# Patient Record
Sex: MALE | Race: WHITE | NOT HISPANIC OR LATINO | Employment: OTHER | URBAN - METROPOLITAN AREA
[De-identification: names, ages, dates, MRNs, and addresses within clinical notes are randomized per-mention and may not be internally consistent; named-entity substitution may affect disease eponyms.]

---

## 2023-03-07 ENCOUNTER — OFFICE VISIT (OUTPATIENT)
Dept: PRIMARY CARE | Facility: CLINIC | Age: 68
End: 2023-03-07
Payer: MEDICARE

## 2023-03-07 ENCOUNTER — APPOINTMENT (OUTPATIENT)
Dept: PRIMARY CARE | Facility: CLINIC | Age: 68
End: 2023-03-07
Payer: MEDICARE

## 2023-03-07 VITALS
OXYGEN SATURATION: 93 % | HEART RATE: 72 BPM | BODY MASS INDEX: 35.22 KG/M2 | DIASTOLIC BLOOD PRESSURE: 82 MMHG | HEIGHT: 70 IN | SYSTOLIC BLOOD PRESSURE: 149 MMHG | WEIGHT: 246 LBS | TEMPERATURE: 98.1 F

## 2023-03-07 DIAGNOSIS — C67.9 BLADDER CARCINOMA (MULTI): ICD-10-CM

## 2023-03-07 DIAGNOSIS — R60.0 LOCALIZED EDEMA: ICD-10-CM

## 2023-03-07 DIAGNOSIS — E78.2 MIXED HYPERLIPIDEMIA: ICD-10-CM

## 2023-03-07 DIAGNOSIS — I10 PRIMARY HYPERTENSION: ICD-10-CM

## 2023-03-07 DIAGNOSIS — J44.9 COPD, MILD (MULTI): Primary | ICD-10-CM

## 2023-03-07 DIAGNOSIS — R73.9 HYPERGLYCEMIA: ICD-10-CM

## 2023-03-07 PROBLEM — D49.4 BLADDER TUMOR: Status: ACTIVE | Noted: 2023-03-07

## 2023-03-07 PROBLEM — I51.7 CARDIOMEGALY: Status: ACTIVE | Noted: 2023-03-07

## 2023-03-07 PROBLEM — N40.0 BPH (BENIGN PROSTATIC HYPERPLASIA): Status: ACTIVE | Noted: 2023-03-07

## 2023-03-07 PROBLEM — I65.23 BILATERAL CAROTID ARTERY STENOSIS: Status: ACTIVE | Noted: 2023-03-07

## 2023-03-07 PROBLEM — I65.29 CAROTID STENOSIS: Status: ACTIVE | Noted: 2023-03-07

## 2023-03-07 PROBLEM — Z93.6 PRESENCE OF UROSTOMY (MULTI): Status: ACTIVE | Noted: 2023-03-07

## 2023-03-07 PROBLEM — K76.0 FATTY LIVER: Status: ACTIVE | Noted: 2023-03-07

## 2023-03-07 PROBLEM — I77.810 DILATED AORTIC ROOT (CMS-HCC): Status: ACTIVE | Noted: 2023-03-07

## 2023-03-07 PROBLEM — M19.90 UNSPECIFIED OSTEOARTHRITIS, UNSPECIFIED SITE: Status: ACTIVE | Noted: 2023-03-07

## 2023-03-07 PROBLEM — E78.5 HYPERLIPIDEMIA: Status: ACTIVE | Noted: 2023-03-07

## 2023-03-07 PROCEDURE — 1160F RVW MEDS BY RX/DR IN RCRD: CPT | Performed by: FAMILY MEDICINE

## 2023-03-07 PROCEDURE — 99214 OFFICE O/P EST MOD 30 MIN: CPT | Performed by: FAMILY MEDICINE

## 2023-03-07 PROCEDURE — 1036F TOBACCO NON-USER: CPT | Performed by: FAMILY MEDICINE

## 2023-03-07 PROCEDURE — 3077F SYST BP >= 140 MM HG: CPT | Performed by: FAMILY MEDICINE

## 2023-03-07 PROCEDURE — 1159F MED LIST DOCD IN RCRD: CPT | Performed by: FAMILY MEDICINE

## 2023-03-07 PROCEDURE — 3079F DIAST BP 80-89 MM HG: CPT | Performed by: FAMILY MEDICINE

## 2023-03-07 RX ORDER — CEPHALEXIN 250 MG/1
250 CAPSULE ORAL DAILY
COMMUNITY
End: 2023-07-26

## 2023-03-07 RX ORDER — LOSARTAN POTASSIUM 100 MG/1
100 TABLET ORAL DAILY
COMMUNITY
Start: 2023-01-30 | End: 2023-06-26

## 2023-03-07 RX ORDER — HYDROCHLOROTHIAZIDE 12.5 MG/1
12.5 TABLET ORAL DAILY
Qty: 30 TABLET | Refills: 5 | Status: SHIPPED | OUTPATIENT
Start: 2023-03-07 | End: 2023-06-26

## 2023-03-07 ASSESSMENT — ENCOUNTER SYMPTOMS
DIAPHORESIS: 0
CHILLS: 0
FATIGUE: 0
SHORTNESS OF BREATH: 1

## 2023-03-07 NOTE — PROGRESS NOTES
"Subjective   Patient ID: Madan Marquez is a 67 y.o. male who presents for High BP and lower leg swelling (Pt is here with his wife d/t elevated BP at Dr. Huntley's office today; 170/93. Pt was there for an Echo; has a follow up scheduled with Dr. Huntley next week. Leg swelling onset 3 days ago. Wife states pt finished abx two days ago; had two teeth extracted. ).    Hypertension :  up today   120-140 /  70-80     Leg edema   Left > right     Left knee pain ,  A little calf tenderness ,  itching   Pain with walking on it       A little SOB  over last few months     Some RUSSELL   Occ swollen gland in right neck  Nasal congestion   Forehead with abnl sensation     Dental problems bilaterally     On prophylactic abx for UTIS     No blood clots in past   No bladder cancer treatment     Routine echo for previous chemo t           Review of Systems   Constitutional:  Negative for chills, diaphoresis and fatigue.   Respiratory:  Positive for shortness of breath.    Cardiovascular:  Negative for chest pain.       Objective   /82   Pulse 72   Temp 36.7 °C (98.1 °F)   Ht 1.778 m (5' 10\")   Wt 112 kg (246 lb)   SpO2 93%   BMI 35.30 kg/m²     Physical Exam  Constitutional:       Appearance: Normal appearance. He is well-developed.   Cardiovascular:      Rate and Rhythm: Normal rate and regular rhythm.      Heart sounds: Normal heart sounds. No murmur heard.     Comments: Bilateral lower extremity 1-2+ to mid tibia, left slightly greater than right, no warmth, no calf tenderness, venous stasis changes  Pulmonary:      Effort: Pulmonary effort is normal.      Breath sounds: Normal breath sounds.   Abdominal:      General: Bowel sounds are normal.      Palpations: Abdomen is soft.   Neurological:      General: No focal deficit present.      Mental Status: He is alert.         Assessment/Plan   Problem List Items Addressed This Visit       COPD, mild (CMS/HCC) - Primary    Hyperglycemia    Hyperlipidemia    Hypertension "    Bladder carcinoma (CMS/HCC)     Other Visit Diagnoses       Localized edema        Relevant Medications    hydroCHLOROthiazide (HYDRODiuril) 12.5 mg tablet    Other Relevant Orders    Basic Metabolic Panel

## 2023-03-07 NOTE — PATIENT INSTRUCTIONS
Blood work as ordered    Elevate the legs, low-salt diet    Hypertension: Change in medications as directed  add on hydrochlorothiazide ,      Check BPS for the next 1-2 weeks       Bladder carcinoma, continue current management per oncology

## 2023-03-14 ENCOUNTER — TELEPHONE (OUTPATIENT)
Dept: PRIMARY CARE | Facility: CLINIC | Age: 68
End: 2023-03-14
Payer: MEDICARE

## 2023-03-14 NOTE — TELEPHONE ENCOUNTER
Pt's wife, Antaoly states they were to call in with blood pressures:       129/79      131/74     139/78     132/76    Anatoly stated pt blood pressures are much improved, and he will be doing his blood work on Thursday.

## 2023-03-20 ENCOUNTER — LAB (OUTPATIENT)
Dept: LAB | Facility: LAB | Age: 68
End: 2023-03-20
Payer: MEDICARE

## 2023-03-20 DIAGNOSIS — R60.0 LOCALIZED EDEMA: ICD-10-CM

## 2023-03-20 LAB
ANION GAP IN SER/PLAS: 15 MMOL/L (ref 10–20)
CALCIUM (MG/DL) IN SER/PLAS: 9.6 MG/DL (ref 8.6–10.3)
CARBON DIOXIDE, TOTAL (MMOL/L) IN SER/PLAS: 27 MMOL/L (ref 21–32)
CHLORIDE (MMOL/L) IN SER/PLAS: 103 MMOL/L (ref 98–107)
CREATININE (MG/DL) IN SER/PLAS: 1.3 MG/DL (ref 0.5–1.3)
GFR MALE: 59 ML/MIN/1.73M2
GLUCOSE (MG/DL) IN SER/PLAS: 95 MG/DL (ref 74–99)
POTASSIUM (MMOL/L) IN SER/PLAS: 4.6 MMOL/L (ref 3.5–5.3)
SODIUM (MMOL/L) IN SER/PLAS: 140 MMOL/L (ref 136–145)
UREA NITROGEN (MG/DL) IN SER/PLAS: 25 MG/DL (ref 6–23)

## 2023-03-20 PROCEDURE — 36415 COLL VENOUS BLD VENIPUNCTURE: CPT

## 2023-03-20 PROCEDURE — 80048 BASIC METABOLIC PNL TOTAL CA: CPT

## 2023-03-22 DIAGNOSIS — R60.0 LOCALIZED EDEMA: ICD-10-CM

## 2023-03-22 NOTE — TELEPHONE ENCOUNTER
Result Communication    Resulted Orders   Basic Metabolic Panel   Result Value Ref Range    Glucose 95 74 - 99 mg/dL    Sodium 140 136 - 145 mmol/L    Potassium 4.6 3.5 - 5.3 mmol/L    Chloride 103 98 - 107 mmol/L    Bicarbonate 27 21 - 32 mmol/L    Anion Gap 15 10 - 20 mmol/L    Urea Nitrogen 25 (H) 6 - 23 mg/dL    Creatinine 1.30 0.50 - 1.30 mg/dL    GFR MALE 59 (A) >90 mL/min/1.73m2      Comment:       CALCULATIONS OF ESTIMATED GFR ARE PERFORMED   USING THE 2021 CKD-EPI STUDY REFIT EQUATION   WITHOUT THE RACE VARIABLE FOR THE IDMS-TRACEABLE   CREATININE METHODS.    https://jasn.asnjournals.org/content/early/2021/09/22/ASN.9771369796    Calcium 9.6 8.6 - 10.3 mg/dL       8:28 AM      Results were successfully communicated with the patient and they acknowledged their understanding.

## 2023-03-22 NOTE — TELEPHONE ENCOUNTER
----- Message from Beatrice Francis MD sent at 3/21/2023  1:19 PM EDT -----  Lab results show electrolytes are okay, kidney function a little borderline but to be expected with the water pill continue the same for now

## 2023-04-19 DIAGNOSIS — I10 ESSENTIAL (PRIMARY) HYPERTENSION: ICD-10-CM

## 2023-04-19 PROBLEM — R31.9 HEMATURIA: Status: RESOLVED | Noted: 2023-04-19 | Resolved: 2023-04-19

## 2023-04-19 PROBLEM — D49.2 GENERALIZED SKIN TUMORS: Status: ACTIVE | Noted: 2023-04-19

## 2023-04-19 PROBLEM — R91.8 LUNG NODULES: Status: ACTIVE | Noted: 2023-04-19

## 2023-04-19 PROBLEM — J96.11 CHRONIC RESPIRATORY FAILURE WITH HYPOXIA (MULTI): Status: ACTIVE | Noted: 2023-04-19

## 2023-04-19 PROBLEM — A41.9 SEPSIS (MULTI): Status: ACTIVE | Noted: 2023-04-19

## 2023-04-19 PROBLEM — N28.1 RENAL CYSTS, ACQUIRED, BILATERAL: Status: ACTIVE | Noted: 2023-04-19

## 2023-04-19 PROBLEM — J01.00 ACUTE MAXILLARY SINUSITIS: Status: RESOLVED | Noted: 2023-04-19 | Resolved: 2023-04-19

## 2023-04-19 PROBLEM — N39.0 ACUTE LOWER UTI: Status: RESOLVED | Noted: 2023-04-19 | Resolved: 2023-04-19

## 2023-04-19 RX ORDER — LOSARTAN POTASSIUM 50 MG/1
50 TABLET ORAL DAILY
Qty: 90 TABLET | Refills: 1 | Status: SHIPPED | OUTPATIENT
Start: 2023-04-19 | End: 2023-05-16 | Stop reason: DRUGHIGH

## 2023-05-16 ENCOUNTER — OFFICE VISIT (OUTPATIENT)
Dept: PRIMARY CARE | Facility: CLINIC | Age: 68
End: 2023-05-16
Payer: MEDICARE

## 2023-05-16 VITALS
OXYGEN SATURATION: 93 % | DIASTOLIC BLOOD PRESSURE: 80 MMHG | HEIGHT: 70 IN | BODY MASS INDEX: 34.65 KG/M2 | WEIGHT: 242 LBS | SYSTOLIC BLOOD PRESSURE: 135 MMHG | HEART RATE: 71 BPM

## 2023-05-16 DIAGNOSIS — Z93.6 PRESENCE OF UROSTOMY (MULTI): ICD-10-CM

## 2023-05-16 DIAGNOSIS — I10 PRIMARY HYPERTENSION: ICD-10-CM

## 2023-05-16 DIAGNOSIS — E78.2 MIXED HYPERLIPIDEMIA: ICD-10-CM

## 2023-05-16 DIAGNOSIS — J44.9 COPD, MILD (MULTI): Primary | ICD-10-CM

## 2023-05-16 DIAGNOSIS — C67.9 BLADDER CARCINOMA (MULTI): ICD-10-CM

## 2023-05-16 DIAGNOSIS — J96.11 CHRONIC RESPIRATORY FAILURE WITH HYPOXIA (MULTI): ICD-10-CM

## 2023-05-16 DIAGNOSIS — I77.810 DILATED AORTIC ROOT (CMS-HCC): ICD-10-CM

## 2023-05-16 DIAGNOSIS — M65.351 TRIGGER LITTLE FINGER OF RIGHT HAND: ICD-10-CM

## 2023-05-16 PROCEDURE — 1036F TOBACCO NON-USER: CPT | Performed by: FAMILY MEDICINE

## 2023-05-16 PROCEDURE — 3075F SYST BP GE 130 - 139MM HG: CPT | Performed by: FAMILY MEDICINE

## 2023-05-16 PROCEDURE — 99214 OFFICE O/P EST MOD 30 MIN: CPT | Performed by: FAMILY MEDICINE

## 2023-05-16 PROCEDURE — 1159F MED LIST DOCD IN RCRD: CPT | Performed by: FAMILY MEDICINE

## 2023-05-16 PROCEDURE — 1160F RVW MEDS BY RX/DR IN RCRD: CPT | Performed by: FAMILY MEDICINE

## 2023-05-16 PROCEDURE — 3079F DIAST BP 80-89 MM HG: CPT | Performed by: FAMILY MEDICINE

## 2023-05-16 ASSESSMENT — ENCOUNTER SYMPTOMS: SHORTNESS OF BREATH: 0

## 2023-05-16 NOTE — PATIENT INSTRUCTIONS
Hypertension Plan:  You should check your blood pressures 2-3 times per month.  Your goal should be systolic (upper number ) < 140, and diastolic (bottom number) < 90.  Please periodically inform office of your BP numbers.  You should follow a low salt diet and exercise routinely.  It is important that you keep your weight under control.  With hypertension you should be seen in the office at least twice per year.     Bladder cancer/UTIs: Continue management per urology, hematology      Trigger finger if bothersome will refer to hand surgeon

## 2023-05-16 NOTE — PROGRESS NOTES
Subjective   Patient ID: Madan Marquez is a 67 y.o. male who presents for Medication review with his wife. No complaints; states his cancer markers are negative.     Hypertension  : Patient is taking blood pressure medications as directed.  Blood pressures have been averaging:  Pt denies Chest Pain or Shortness of Breath     COPD: Stable, seeing pulmonology  Not needing O2       Bladder resection status post bladder cancer, tumor markers negative, recent heme-onc visit 418, stable  Signatera test was normal     No recent infections     Right pinky finger getting stuck at times not to some  Right         Review of Systems   Respiratory:  Negative for shortness of breath.    Cardiovascular:  Negative for chest pain.       Objective   There were no vitals taken for this visit.    Physical Exam  Constitutional:       Appearance: Normal appearance. He is well-developed.   Cardiovascular:      Rate and Rhythm: Normal rate and regular rhythm.      Heart sounds: Normal heart sounds. No murmur heard.  Pulmonary:      Effort: Pulmonary effort is normal.      Breath sounds: Normal breath sounds.   Musculoskeletal:      Comments: Right pinky finger with trigger finger   Neurological:      General: No focal deficit present.      Mental Status: He is alert.         Assessment/Plan   Problem List Items Addressed This Visit       COPD, mild (CMS/HCC) - Primary    Dilated aortic root (CMS/HCC)    Hyperlipidemia    Hypertension    Presence of urostomy (CMS/HCC)    Bladder carcinoma (CMS/HCC)    Chronic respiratory failure with hypoxia (CMS/HCC)

## 2023-06-06 ENCOUNTER — APPOINTMENT (OUTPATIENT)
Dept: LAB | Facility: LAB | Age: 68
End: 2023-06-06
Payer: MEDICARE

## 2023-06-07 LAB
ANION GAP IN SER/PLAS: 13 MMOL/L (ref 10–20)
CALCIUM (MG/DL) IN SER/PLAS: 10.3 MG/DL (ref 8.6–10.6)
CARBON DIOXIDE, TOTAL (MMOL/L) IN SER/PLAS: 27 MMOL/L (ref 21–32)
CHLORIDE (MMOL/L) IN SER/PLAS: 102 MMOL/L (ref 98–107)
CREATININE (MG/DL) IN SER/PLAS: 1.12 MG/DL (ref 0.5–1.3)
GFR MALE: 71 ML/MIN/1.73M2
GLUCOSE (MG/DL) IN SER/PLAS: 93 MG/DL (ref 74–99)
POTASSIUM (MMOL/L) IN SER/PLAS: 4 MMOL/L (ref 3.5–5.3)
SODIUM (MMOL/L) IN SER/PLAS: 138 MMOL/L (ref 136–145)
UREA NITROGEN (MG/DL) IN SER/PLAS: 21 MG/DL (ref 6–23)

## 2023-06-09 ENCOUNTER — TELEPHONE (OUTPATIENT)
Dept: PRIMARY CARE | Facility: CLINIC | Age: 68
End: 2023-06-09
Payer: MEDICARE

## 2023-06-10 NOTE — TELEPHONE ENCOUNTER
PT was recently proscribed a new medication from his dentist and wants to be sure it is ok to take with his current regiment of medications. Please call.  Per [facility] the Amoxicillin is fine to take but soince he is already takin gthe Cephalexin he does not need the Amoxicillin. Pt's wife Anatoly informed. [place]

## 2023-06-25 DIAGNOSIS — R60.0 LOCALIZED EDEMA: ICD-10-CM

## 2023-06-25 DIAGNOSIS — I10 ESSENTIAL (PRIMARY) HYPERTENSION: ICD-10-CM

## 2023-06-26 PROBLEM — E87.5 HYPERKALEMIA: Status: ACTIVE | Noted: 2023-06-26

## 2023-06-26 RX ORDER — LOSARTAN POTASSIUM 100 MG/1
TABLET ORAL
Qty: 90 TABLET | Refills: 1 | Status: SHIPPED | OUTPATIENT
Start: 2023-06-26 | End: 2023-12-22

## 2023-06-26 RX ORDER — HYDROCHLOROTHIAZIDE 12.5 MG/1
TABLET ORAL
Qty: 90 TABLET | Refills: 1 | Status: SHIPPED | OUTPATIENT
Start: 2023-06-26 | End: 2023-12-22

## 2023-07-26 DIAGNOSIS — Z93.6 OTHER ARTIFICIAL OPENINGS OF URINARY TRACT STATUS (MULTI): ICD-10-CM

## 2023-07-26 RX ORDER — CEPHALEXIN 250 MG/1
250 CAPSULE ORAL DAILY
Qty: 90 CAPSULE | Refills: 1 | Status: SHIPPED | OUTPATIENT
Start: 2023-07-26 | End: 2024-01-17

## 2023-08-16 PROBLEM — Z87.891 EX-CIGARETTE SMOKER: Status: ACTIVE | Noted: 2023-08-16

## 2023-09-02 PROBLEM — N28.1 KIDNEY CYSTS: Status: ACTIVE | Noted: 2023-09-02

## 2023-09-13 LAB
ALANINE AMINOTRANSFERASE (SGPT) (U/L) IN SER/PLAS: 24 U/L (ref 10–52)
ALBUMIN (G/DL) IN SER/PLAS: 4.1 G/DL (ref 3.4–5)
ALKALINE PHOSPHATASE (U/L) IN SER/PLAS: 63 U/L (ref 33–136)
ANION GAP IN SER/PLAS: 14 MMOL/L (ref 10–20)
ASPARTATE AMINOTRANSFERASE (SGOT) (U/L) IN SER/PLAS: 16 U/L (ref 9–39)
BILIRUBIN TOTAL (MG/DL) IN SER/PLAS: 0.5 MG/DL (ref 0–1.2)
CALCIUM (MG/DL) IN SER/PLAS: 9.6 MG/DL (ref 8.6–10.3)
CARBON DIOXIDE, TOTAL (MMOL/L) IN SER/PLAS: 25 MMOL/L (ref 21–32)
CHLORIDE (MMOL/L) IN SER/PLAS: 103 MMOL/L (ref 98–107)
CREATININE (MG/DL) IN SER/PLAS: 1.16 MG/DL (ref 0.5–1.3)
ERYTHROCYTE DISTRIBUTION WIDTH (RATIO) BY AUTOMATED COUNT: 13.6 % (ref 11.5–14.5)
ERYTHROCYTE MEAN CORPUSCULAR HEMOGLOBIN CONCENTRATION (G/DL) BY AUTOMATED: 31.4 G/DL (ref 32–36)
ERYTHROCYTE MEAN CORPUSCULAR VOLUME (FL) BY AUTOMATED COUNT: 94 FL (ref 80–100)
ERYTHROCYTES (10*6/UL) IN BLOOD BY AUTOMATED COUNT: 5.04 X10E12/L (ref 4.5–5.9)
GFR MALE: 68 ML/MIN/1.73M2
GLUCOSE (MG/DL) IN SER/PLAS: 148 MG/DL (ref 74–99)
HEMATOCRIT (%) IN BLOOD BY AUTOMATED COUNT: 47.2 % (ref 41–52)
HEMOGLOBIN (G/DL) IN BLOOD: 14.8 G/DL (ref 13.5–17.5)
LEUKOCYTES (10*3/UL) IN BLOOD BY AUTOMATED COUNT: 9 X10E9/L (ref 4.4–11.3)
PLATELETS (10*3/UL) IN BLOOD AUTOMATED COUNT: 290 X10E9/L (ref 150–450)
POTASSIUM (MMOL/L) IN SER/PLAS: 4.6 MMOL/L (ref 3.5–5.3)
PROTEIN TOTAL: 7.4 G/DL (ref 6.4–8.2)
SODIUM (MMOL/L) IN SER/PLAS: 137 MMOL/L (ref 136–145)
UREA NITROGEN (MG/DL) IN SER/PLAS: 22 MG/DL (ref 6–23)

## 2023-10-17 ENCOUNTER — OFFICE VISIT (OUTPATIENT)
Dept: HEMATOLOGY/ONCOLOGY | Facility: CLINIC | Age: 68
End: 2023-10-17
Payer: MEDICARE

## 2023-10-17 VITALS
HEART RATE: 73 BPM | OXYGEN SATURATION: 94 % | TEMPERATURE: 98.1 F | HEIGHT: 69 IN | BODY MASS INDEX: 36.38 KG/M2 | DIASTOLIC BLOOD PRESSURE: 80 MMHG | WEIGHT: 245.59 LBS | SYSTOLIC BLOOD PRESSURE: 160 MMHG | RESPIRATION RATE: 16 BRPM

## 2023-10-17 DIAGNOSIS — C67.9 BLADDER CARCINOMA (MULTI): ICD-10-CM

## 2023-10-17 PROCEDURE — 1126F AMNT PAIN NOTED NONE PRSNT: CPT | Performed by: INTERNAL MEDICINE

## 2023-10-17 PROCEDURE — 3079F DIAST BP 80-89 MM HG: CPT | Performed by: INTERNAL MEDICINE

## 2023-10-17 PROCEDURE — 1160F RVW MEDS BY RX/DR IN RCRD: CPT | Performed by: INTERNAL MEDICINE

## 2023-10-17 PROCEDURE — 99214 OFFICE O/P EST MOD 30 MIN: CPT | Performed by: INTERNAL MEDICINE

## 2023-10-17 PROCEDURE — 99214 OFFICE O/P EST MOD 30 MIN: CPT | Mod: PO | Performed by: INTERNAL MEDICINE

## 2023-10-17 PROCEDURE — 1036F TOBACCO NON-USER: CPT | Performed by: INTERNAL MEDICINE

## 2023-10-17 PROCEDURE — 1159F MED LIST DOCD IN RCRD: CPT | Performed by: INTERNAL MEDICINE

## 2023-10-17 PROCEDURE — 3077F SYST BP >= 140 MM HG: CPT | Performed by: INTERNAL MEDICINE

## 2023-10-17 ASSESSMENT — COLUMBIA-SUICIDE SEVERITY RATING SCALE - C-SSRS
1. IN THE PAST MONTH, HAVE YOU WISHED YOU WERE DEAD OR WISHED YOU COULD GO TO SLEEP AND NOT WAKE UP?: NO
6. HAVE YOU EVER DONE ANYTHING, STARTED TO DO ANYTHING, OR PREPARED TO DO ANYTHING TO END YOUR LIFE?: NO
2. HAVE YOU ACTUALLY HAD ANY THOUGHTS OF KILLING YOURSELF?: NO

## 2023-10-17 ASSESSMENT — PATIENT HEALTH QUESTIONNAIRE - PHQ9
SUM OF ALL RESPONSES TO PHQ9 QUESTIONS 1 AND 2: 0
1. LITTLE INTEREST OR PLEASURE IN DOING THINGS: NOT AT ALL
2. FEELING DOWN, DEPRESSED OR HOPELESS: NOT AT ALL

## 2023-10-17 ASSESSMENT — PAIN SCALES - GENERAL: PAINLEVEL: 0-NO PAIN

## 2023-10-17 NOTE — PROGRESS NOTES
"Patient ID: Madan Marquez is a 68 y.o. male.  Referring Physician: No referring provider defined for this encounter.  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 History of BCG for  non muscle invasive bladder cancer.  CT in follow up showed only bladder wall thickening which is unchanged.  Signatera has been negative.  When last seen he was septic and sent to ER for urosepsis.  CT at that time 10/10/22 was negative for metastatic  disease.     Review of Systems   Constitutional: Negative.    HENT:  Negative.     Eyes: Negative.    Respiratory: Negative.     Cardiovascular: Negative.    Gastrointestinal: Negative.    Genitourinary: Negative.          Objective   BSA: 2.33 meters squared  /80 (BP Location: Left arm, Patient Position: Sitting, BP Cuff Size: Adult)   Pulse 73   Temp 36.7 °C (98.1 °F) (Temporal)   Resp 16   Ht (S) 1.755 m (5' 9.09\")   Wt 111 kg (245 lb 9.5 oz)   SpO2 94%   BMI 36.17 kg/m²     Family History   Problem Relation Name Age of Onset    GI problems Mother      Heart failure Mother      Heart disease Father      Heart attack Father       Oncology History    No history exists.       Madan Marquez  reports that he has quit smoking. His smoking use included cigarettes. He has never used smokeless tobacco.  He  reports that he does not currently use alcohol.  He  reports no history of drug use.    Physical Exam  Constitutional:       Appearance: Normal appearance.   HENT:      Head: Normocephalic and atraumatic.      Nose: Nose normal.   Eyes:      Extraocular Movements: Extraocular movements intact.      Pupils: Pupils are equal, round, and reactive to light.   Cardiovascular:      Rate and Rhythm: Normal rate and regular rhythm.   Musculoskeletal:      Cervical back: Neck supple.   Neurological:      " Mental Status: He is alert.         Performance Status:  Asymptomatic    Assessment/Plan    S/P cystoprostatectomy for a muscle invasive bladder cancer, January 2022. T3a N0  MRD testing negative : RTC 6 months : Continue Signatera.     Offered adjuvant Cisplatin based chemotherapy and /or 1 year of adjuvant Opdivo. He is reluctant due to side effects etc. Post op CT C/A/P and Signatera test negative.      REcommend repeat Signatera every 3 months and CT body every 6 months, if Signatera is negative.  If orders for CT upcoming are valid from other physicians will not order CT for 4/10/23.  Will reorder Signatera and labs prior to OV with Dr Valencia in 3 months.     04/18/2023: Patient seen today patient diagnosed with muscle invasive bladder cancer January 2022 treated with cystoprostatectomy.  Patient refused chemotherapy.  Patient has since been negative for evidence of disease recurrence as evidenced by CT scanning  and Signatera testing.  We will continue the same.  RTC 6 months.      Diagnoses and all orders for this visit:  Bladder carcinoma (CMS/HCC)  -     Clinic Appointment Request Follow up; Future             Donnie-Woody Valencia MD

## 2023-10-17 NOTE — PATIENT INSTRUCTIONS
Thank you for the visit today with Dr. Valencia and his wonderful Nurse Jolene.  It is a pleasure as always to discuss your NEGATIVE Signatera results.  We will continue this every 3 month testing for 1 more year.  Dr. Ward will order your Cts.   We will see you in 6 months.

## 2023-10-18 ASSESSMENT — ENCOUNTER SYMPTOMS
EYES NEGATIVE: 1
CARDIOVASCULAR NEGATIVE: 1
CONSTITUTIONAL NEGATIVE: 1
GASTROINTESTINAL NEGATIVE: 1
RESPIRATORY NEGATIVE: 1

## 2023-11-14 ENCOUNTER — CLINICAL SUPPORT (OUTPATIENT)
Dept: PRIMARY CARE | Facility: CLINIC | Age: 68
End: 2023-11-14
Payer: MEDICARE

## 2023-11-14 DIAGNOSIS — Z23 NEEDS FLU SHOT: ICD-10-CM

## 2023-11-14 PROCEDURE — G0008 ADMIN INFLUENZA VIRUS VAC: HCPCS | Performed by: FAMILY MEDICINE

## 2023-11-14 PROCEDURE — 90662 IIV NO PRSV INCREASED AG IM: CPT | Performed by: FAMILY MEDICINE

## 2023-12-13 ENCOUNTER — LAB (OUTPATIENT)
Dept: LAB | Facility: LAB | Age: 68
End: 2023-12-13
Payer: MEDICARE

## 2023-12-13 DIAGNOSIS — C67.9 MALIGNANT NEOPLASM OF BLADDER, UNSPECIFIED (MULTI): Primary | ICD-10-CM

## 2023-12-13 LAB
ALBUMIN SERPL BCP-MCNC: 4.2 G/DL (ref 3.4–5)
ALP SERPL-CCNC: 56 U/L (ref 33–136)
ALT SERPL W P-5'-P-CCNC: 37 U/L (ref 10–52)
ANION GAP SERPL CALC-SCNC: 15 MMOL/L (ref 10–20)
AST SERPL W P-5'-P-CCNC: 21 U/L (ref 9–39)
BILIRUB SERPL-MCNC: 0.5 MG/DL (ref 0–1.2)
BUN SERPL-MCNC: 24 MG/DL (ref 6–23)
CALCIUM SERPL-MCNC: 10 MG/DL (ref 8.6–10.3)
CHLORIDE SERPL-SCNC: 104 MMOL/L (ref 98–107)
CO2 SERPL-SCNC: 25 MMOL/L (ref 21–32)
CREAT SERPL-MCNC: 1.23 MG/DL (ref 0.5–1.3)
ERYTHROCYTE [DISTWIDTH] IN BLOOD BY AUTOMATED COUNT: 13.5 % (ref 11.5–14.5)
GFR SERPL CREATININE-BSD FRML MDRD: 63 ML/MIN/1.73M*2
GLUCOSE SERPL-MCNC: 101 MG/DL (ref 74–99)
HCT VFR BLD AUTO: 48.3 % (ref 41–52)
HGB BLD-MCNC: 15.5 G/DL (ref 13.5–17.5)
MCH RBC QN AUTO: 30.3 PG (ref 26–34)
MCHC RBC AUTO-ENTMCNC: 32.1 G/DL (ref 32–36)
MCV RBC AUTO: 95 FL (ref 80–100)
NRBC BLD-RTO: 0 /100 WBCS (ref 0–0)
PLATELET # BLD AUTO: 303 X10*3/UL (ref 150–450)
POTASSIUM SERPL-SCNC: 4.5 MMOL/L (ref 3.5–5.3)
PROT SERPL-MCNC: 7.3 G/DL (ref 6.4–8.2)
RBC # BLD AUTO: 5.11 X10*6/UL (ref 4.5–5.9)
SODIUM SERPL-SCNC: 139 MMOL/L (ref 136–145)
WBC # BLD AUTO: 9.5 X10*3/UL (ref 4.4–11.3)

## 2023-12-13 PROCEDURE — 80053 COMPREHEN METABOLIC PANEL: CPT

## 2023-12-13 PROCEDURE — 85027 COMPLETE CBC AUTOMATED: CPT

## 2023-12-13 PROCEDURE — 36415 COLL VENOUS BLD VENIPUNCTURE: CPT

## 2023-12-14 DIAGNOSIS — C67.9 BLADDER CARCINOMA (MULTI): ICD-10-CM

## 2023-12-20 ENCOUNTER — LAB (OUTPATIENT)
Dept: LAB | Facility: HOSPITAL | Age: 68
End: 2023-12-20
Payer: MEDICARE

## 2023-12-20 DIAGNOSIS — C67.9 BLADDER CARCINOMA (MULTI): ICD-10-CM

## 2023-12-20 PROCEDURE — 36416 COLLJ CAPILLARY BLOOD SPEC: CPT

## 2023-12-22 DIAGNOSIS — R60.0 LOCALIZED EDEMA: ICD-10-CM

## 2023-12-22 DIAGNOSIS — I10 ESSENTIAL (PRIMARY) HYPERTENSION: ICD-10-CM

## 2023-12-22 PROBLEM — K43.5 PARASTOMAL HERNIA OF ILEAL CONDUIT: Status: ACTIVE | Noted: 2023-12-22

## 2023-12-22 RX ORDER — LOSARTAN POTASSIUM 100 MG/1
TABLET ORAL
Qty: 90 TABLET | Refills: 0 | Status: SHIPPED | OUTPATIENT
Start: 2023-12-22 | End: 2024-03-25

## 2023-12-22 RX ORDER — HYDROCHLOROTHIAZIDE 12.5 MG/1
12.5 TABLET ORAL DAILY
Qty: 90 TABLET | Refills: 0 | Status: SHIPPED | OUTPATIENT
Start: 2023-12-22 | End: 2024-03-18

## 2023-12-28 ENCOUNTER — HOSPITAL ENCOUNTER (OUTPATIENT)
Dept: RADIOLOGY | Facility: HOSPITAL | Age: 68
Discharge: HOME | End: 2023-12-28
Payer: MEDICARE

## 2023-12-28 DIAGNOSIS — C67.9 MALIGNANT NEOPLASM OF BLADDER, UNSPECIFIED (MULTI): ICD-10-CM

## 2023-12-28 PROCEDURE — 2550000001 HC RX 255 CONTRASTS: Performed by: STUDENT IN AN ORGANIZED HEALTH CARE EDUCATION/TRAINING PROGRAM

## 2023-12-28 PROCEDURE — 74178 CT ABD&PLV WO CNTR FLWD CNTR: CPT

## 2023-12-28 PROCEDURE — 71250 CT THORAX DX C-: CPT | Performed by: RADIOLOGY

## 2023-12-28 PROCEDURE — 71250 CT THORAX DX C-: CPT

## 2023-12-28 RX ADMIN — IOHEXOL 90 ML: 350 INJECTION, SOLUTION INTRAVENOUS at 09:45

## 2024-01-15 ENCOUNTER — APPOINTMENT (OUTPATIENT)
Dept: UROLOGY | Facility: HOSPITAL | Age: 69
End: 2024-01-15
Payer: MEDICARE

## 2024-01-15 ENCOUNTER — OFFICE VISIT (OUTPATIENT)
Dept: UROLOGY | Facility: CLINIC | Age: 69
End: 2024-01-15
Payer: MEDICARE

## 2024-01-15 DIAGNOSIS — C67.9 BLADDER CARCINOMA (MULTI): ICD-10-CM

## 2024-01-15 DIAGNOSIS — K57.92 DIVERTICULITIS: ICD-10-CM

## 2024-01-15 PROCEDURE — 99214 OFFICE O/P EST MOD 30 MIN: CPT | Performed by: STUDENT IN AN ORGANIZED HEALTH CARE EDUCATION/TRAINING PROGRAM

## 2024-01-15 PROCEDURE — 1036F TOBACCO NON-USER: CPT | Performed by: STUDENT IN AN ORGANIZED HEALTH CARE EDUCATION/TRAINING PROGRAM

## 2024-01-15 PROCEDURE — 1126F AMNT PAIN NOTED NONE PRSNT: CPT | Performed by: STUDENT IN AN ORGANIZED HEALTH CARE EDUCATION/TRAINING PROGRAM

## 2024-01-15 NOTE — PROGRESS NOTES
Subjective   Patient ID: Madan Marquez is a 68 y.o. male.    HPI  67 yo male F/U s/p open radical cystoprostatectomy and ileal conduit diversion January 7, 2022.   No complaints generally, lost some weight intentionally. Overall doing great.    He reports occasional left sided abdominal pain. No constipation. Has not seen GI.     CT urography 12/28/23:   IMPRESSION:  No hydronephrosis. Stable renal cysts. Ileal conduit and cystectomy  changes. No findings of metastatic disease progression. Marked  hepatic steatosis.      Right lower quadrant parastomal hernia with some mild edema in the  hernia sac when compared to prior examination. Clinical correlation  advised to exclude any component of edema or incarceration. Small  umbilical hernia containing fat.      Diverticulosis with some fluid seen in the colon. Query any component  of colitis      CT chest 12/28/23:   IMPRESSION:  1.  Stable emphysema as well as pulmonary nodules. No growing  pulmonary nodules. Enlarged main pulmonary artery compatible  pulmonary arterial hypertension. Stable hilar and mediastinal  slightly prominent lymph nodes. No growing lymph nodes.    Review of Systems   All other systems reviewed and are negative.      Objective   Physical Exam  Vitals reviewed.     Abdomen: /right urostomy normal-looking, clear urine, umbilical hernia, tender, abdomen soft    Assessment/Plan   67 yo male with [id number] bladder cancer s/p open radical cystoprostatectomy and ileal conduit diversion in January 2022, doing great. No evidence of recurrence on imaging.     I personally reviewed the images and the reports  of the CT. This showed stable emphysema as well as pulmonary nodules. No growing pulmonary nodules. Enlarged main pulmonary artery compatible pulmonary arterial hypertension. Diverticulosis.     We discussed shifting to every 6-month imaging.     I recommend he see GI for evaluation of left sided abdominal pain and diverticulosis.  Referral  placed.     Plan:   CBC, CMP, CTU and CT chest June 2024.   Referral to GI for diverticulosis and abdominal pain.   FUV July 2024 after imaging.   Diagnoses and all orders for this visit:  Bladder carcinoma (CMS/HCC)  -     CT chest wo IV contrast; Future  -     CT urography w 3D volume rendered imaging; Future  -     CBC; Future  -     Comprehensive metabolic panel; Future  Diverticulitis  -     Referral to Gastroenterology; Future    Scribe Attestation  By signing my name below, IDanika Scribe attest that this documentation has been prepared under the direction and in the presence of Carlos Manuel Ward MD.

## 2024-01-17 DIAGNOSIS — Z93.6 OTHER ARTIFICIAL OPENINGS OF URINARY TRACT STATUS (MULTI): ICD-10-CM

## 2024-01-17 PROBLEM — R53.1 WEAKNESS: Status: ACTIVE | Noted: 2024-01-17

## 2024-01-17 PROBLEM — H69.90 DYSFUNCTION OF EUSTACHIAN TUBE: Status: RESOLVED | Noted: 2024-01-17 | Resolved: 2024-01-17

## 2024-01-17 PROBLEM — R78.81 BACTEREMIA: Status: RESOLVED | Noted: 2022-10-12 | Resolved: 2024-01-17

## 2024-01-17 PROBLEM — R06.09 DYSPNEA ON EXERTION: Status: ACTIVE | Noted: 2024-01-17

## 2024-01-17 PROBLEM — Z66 DO NOT RESUSCITATE: Status: ACTIVE | Noted: 2022-10-12

## 2024-01-17 PROBLEM — A41.9 SEPTIC SHOCK (MULTI): Status: RESOLVED | Noted: 2024-01-17 | Resolved: 2024-01-17

## 2024-01-17 PROBLEM — E11.9 TYPE 2 DIABETES MELLITUS (MULTI): Status: ACTIVE | Noted: 2024-01-17

## 2024-01-17 PROBLEM — S37.009A INJURY OF KIDNEY: Status: RESOLVED | Noted: 2022-10-27 | Resolved: 2024-01-17

## 2024-01-17 PROBLEM — J30.9 ALLERGIC RHINITIS: Status: ACTIVE | Noted: 2024-01-17

## 2024-01-17 PROBLEM — R65.21 SEPTIC SHOCK (MULTI): Status: RESOLVED | Noted: 2024-01-17 | Resolved: 2024-01-17

## 2024-01-17 PROBLEM — R09.02 HYPOXIA: Status: RESOLVED | Noted: 2024-01-17 | Resolved: 2024-01-17

## 2024-01-17 PROBLEM — Z20.822 CONTACT WITH AND (SUSPECTED) EXPOSURE TO COVID-19: Status: RESOLVED | Noted: 2022-10-27 | Resolved: 2024-01-17

## 2024-01-17 PROBLEM — D72.829 LEUKOCYTOSIS: Status: RESOLVED | Noted: 2023-04-04 | Resolved: 2024-01-17

## 2024-01-17 PROBLEM — G89.18 ACUTE POSTOPERATIVE PAIN: Status: RESOLVED | Noted: 2024-01-17 | Resolved: 2024-01-17

## 2024-01-17 PROBLEM — R63.5 WEIGHT GAIN: Status: ACTIVE | Noted: 2024-01-17

## 2024-01-17 PROBLEM — R59.9 ADENOPATHY: Status: ACTIVE | Noted: 2023-05-17

## 2024-01-17 LAB — SCAN RESULT: NORMAL

## 2024-01-17 RX ORDER — TIOTROPIUM BROMIDE 18 UG/1
CAPSULE ORAL; RESPIRATORY (INHALATION)
COMMUNITY
Start: 2020-02-07

## 2024-01-17 RX ORDER — PREDNISONE 20 MG/1
TABLET ORAL
COMMUNITY
Start: 2016-02-08 | End: 2024-04-25 | Stop reason: ALTCHOICE

## 2024-01-17 RX ORDER — CEPHALEXIN 250 MG/1
250 CAPSULE ORAL DAILY
Qty: 90 CAPSULE | Refills: 1 | Status: SHIPPED | OUTPATIENT
Start: 2024-01-17

## 2024-03-04 DIAGNOSIS — C67.9 BLADDER CARCINOMA (MULTI): ICD-10-CM

## 2024-03-13 ENCOUNTER — LAB (OUTPATIENT)
Dept: LAB | Facility: HOSPITAL | Age: 69
End: 2024-03-13
Payer: MEDICARE

## 2024-03-13 DIAGNOSIS — C67.9 BLADDER CARCINOMA (MULTI): ICD-10-CM

## 2024-03-13 PROCEDURE — 36415 COLL VENOUS BLD VENIPUNCTURE: CPT

## 2024-03-17 DIAGNOSIS — R60.0 LOCALIZED EDEMA: ICD-10-CM

## 2024-03-18 RX ORDER — HYDROCHLOROTHIAZIDE 12.5 MG/1
12.5 TABLET ORAL DAILY
Qty: 90 TABLET | Refills: 0 | Status: SHIPPED | OUTPATIENT
Start: 2024-03-18 | End: 2024-04-25 | Stop reason: SDUPTHER

## 2024-03-23 DIAGNOSIS — I10 ESSENTIAL (PRIMARY) HYPERTENSION: ICD-10-CM

## 2024-03-25 RX ORDER — LOSARTAN POTASSIUM 100 MG/1
TABLET ORAL
Qty: 90 TABLET | Refills: 0 | Status: SHIPPED | OUTPATIENT
Start: 2024-03-25 | End: 2024-04-25 | Stop reason: SDUPTHER

## 2024-03-26 LAB — SCAN RESULT: NORMAL

## 2024-04-18 ENCOUNTER — OFFICE VISIT (OUTPATIENT)
Dept: HEMATOLOGY/ONCOLOGY | Facility: CLINIC | Age: 69
End: 2024-04-18
Payer: MEDICARE

## 2024-04-18 VITALS
SYSTOLIC BLOOD PRESSURE: 123 MMHG | OXYGEN SATURATION: 96 % | TEMPERATURE: 97.5 F | WEIGHT: 246.25 LBS | HEART RATE: 67 BPM | BODY MASS INDEX: 36.27 KG/M2 | RESPIRATION RATE: 20 BRPM | DIASTOLIC BLOOD PRESSURE: 71 MMHG

## 2024-04-18 DIAGNOSIS — C67.9 BLADDER CARCINOMA (MULTI): ICD-10-CM

## 2024-04-18 PROCEDURE — 4010F ACE/ARB THERAPY RXD/TAKEN: CPT | Performed by: INTERNAL MEDICINE

## 2024-04-18 PROCEDURE — 99214 OFFICE O/P EST MOD 30 MIN: CPT | Performed by: INTERNAL MEDICINE

## 2024-04-18 PROCEDURE — 3078F DIAST BP <80 MM HG: CPT | Performed by: INTERNAL MEDICINE

## 2024-04-18 PROCEDURE — 1126F AMNT PAIN NOTED NONE PRSNT: CPT | Performed by: INTERNAL MEDICINE

## 2024-04-18 PROCEDURE — 3074F SYST BP LT 130 MM HG: CPT | Performed by: INTERNAL MEDICINE

## 2024-04-18 PROCEDURE — 1159F MED LIST DOCD IN RCRD: CPT | Performed by: INTERNAL MEDICINE

## 2024-04-18 ASSESSMENT — PAIN SCALES - GENERAL: PAINLEVEL: 0-NO PAIN

## 2024-04-18 NOTE — PATIENT INSTRUCTIONS
Today you met with Dr. Valencia and you will continue your Signatera every 3 months.  Continue your imaging with Dr. Ward.  Stay active and drink more fluids.  Think about metamucil.    Please call the office for any questions or concerns.  Review your schedule prior to leaving [facility].  We ask that you notify us 5-7 days before your medications need refilled.   [facility] is strongly encouraging all patients to access their MyChart for all results and to communicate with their provider for non-urgent messages.  If an urgent/same day/sick concern response is needed, please call the office at [phone]. Thank You!

## 2024-04-18 NOTE — PROGRESS NOTES
Patient ID: Madan Marquez is a 68 y.o. male.  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 History of BCG for  non muscle invasive bladder cancer.  CT in follow up showed only bladder wall thickening which is unchanged.  Signatera has been negative.  When last seen he was septic and sent to ER for urosepsis.  CT at that time 10/10/22 was negative for metastatic  disease.   Review of Systems   Constitutional: Negative.    HENT:  Negative.     Respiratory: Negative.     Cardiovascular: Negative.         Objective   BSA: 2.34 meters squared  /71 (BP Location: Left arm, Patient Position: Sitting, BP Cuff Size: Large adult)   Pulse 67   Temp 36.4 °C (97.5 °F) (Temporal)   Resp 20   Wt 112 kg (246 lb 4.1 oz)   SpO2 96%   BMI 36.27 kg/m²      has a past medical history of Abdominal distension (gaseous) (05/08/2019), Acute lower UTI (04/19/2023), Acute maxillary sinusitis (04/19/2023), Acute postoperative pain (01/17/2024), Bacteremia (10/12/2022), Body mass index (BMI) 35.0-35.9, adult (04/07/2021), Contact with and (suspected) exposure to covid-19 (10/27/2022), Dysfunction of eustachian tube (01/17/2024), Elevated blood-pressure reading, without diagnosis of hypertension (11/11/2016), Hematuria (04/19/2023), Hypoxia (01/17/2024), Ingrowing nail (08/27/2017), Injury of kidney (10/27/2022), Leukocytosis (04/04/2023), Localized edema (03/15/2019), Morbid (severe) obesity due to excess calories (Multi) (04/07/2021), Other chest pain (05/17/2019), Other transient cerebral ischemic attacks and related syndromes, Personal history of diseases of the blood and blood-forming organs and certain disorders  involving the immune mechanism (05/17/2019), Personal history of other diseases of the nervous system and sense organs (01/13/2020), Personal history of other diseases of the respiratory system (11/06/2017), Personal history of other diseases of the respiratory system (06/25/2019), Personal history of other diseases of the respiratory system (02/08/2016), Personal history of other endocrine, nutritional and metabolic disease (10/06/2021), Personal history of other specified conditions (01/22/2019), Personal history of other specified conditions (08/15/2018), Personal history of transient ischemic attack (TIA), and cerebral infarction without residual deficits (05/05/2017), Septic shock (Multi) (01/17/2024), and Unspecified symptoms and signs involving the genitourinary system (05/09/2019).   has a past surgical history that includes Other surgical history (11/06/2017); Tonsillectomy (11/06/2017); and Other surgical history (09/19/2022).  Family History   Problem Relation Name Age of Onset    GI problems Mother      Heart failure Mother      Heart disease Father      Heart attack Father       Oncology History    No history exists.       Madan Marquez  reports that he has quit smoking. His smoking use included cigarettes. He has never used smokeless tobacco.  He  reports that he does not currently use alcohol.  He  reports no history of drug use.    Physical Exam  Constitutional:       Appearance: Normal appearance.   HENT:      Head: Normocephalic and atraumatic.   Eyes:      Extraocular Movements: Extraocular movements intact.      Pupils: Pupils are equal, round, and reactive to light.   Neurological:      Mental Status: He is alert.         WBC   Date/Time Value Ref Range Status   12/13/2023 08:37 AM 9.5 4.4 - 11.3 x10*3/uL Final   09/13/2023 08:02 AM 9.0 4.4 - 11.3 x10E9/L Final   04/04/2023 08:44 AM 8.3 4.4 - 11.3 x10E9/L Final   12/20/2022 09:20 AM 9.7 4.4 - 11.3 x10E9/L Final     nRBC   Date Value Ref  "Range Status   12/13/2023 0.0 0.0 - 0.0 /100 WBCs Final   01/12/2022 0.0 0.0 - 0.0 /100 WBC Final   01/11/2022 0.0 0.0 - 0.0 /100 WBC Final   01/10/2022 0.0 0.0 - 0.0 /100 WBC Final     RBC   Date Value Ref Range Status   12/13/2023 5.11 4.50 - 5.90 x10*6/uL Final   09/13/2023 5.04 4.50 - 5.90 x10E12/L Final   04/04/2023 5.06 4.50 - 5.90 x10E12/L Final   12/20/2022 4.80 4.50 - 5.90 x10E12/L Final     Hemoglobin   Date Value Ref Range Status   12/13/2023 15.5 13.5 - 17.5 g/dL Final   09/13/2023 14.8 13.5 - 17.5 g/dL Final   04/04/2023 14.7 13.5 - 17.5 g/dL Final   12/20/2022 14.3 13.5 - 17.5 g/dL Final     Hematocrit   Date Value Ref Range Status   12/13/2023 48.3 41.0 - 52.0 % Final   09/13/2023 47.2 41.0 - 52.0 % Final   04/04/2023 46.6 41.0 - 52.0 % Final   12/20/2022 45.3 41.0 - 52.0 % Final     MCV   Date/Time Value Ref Range Status   12/13/2023 08:37 AM 95 80 - 100 fL Final   09/13/2023 08:02 AM 94 80 - 100 fL Final   04/04/2023 08:44 AM 92 80 - 100 fL Final   12/20/2022 09:20 AM 94 80 - 100 fL Final     MCH   Date/Time Value Ref Range Status   12/13/2023 08:37 AM 30.3 26.0 - 34.0 pg Final     MCHC   Date/Time Value Ref Range Status   12/13/2023 08:37 AM 32.1 32.0 - 36.0 g/dL Final   09/13/2023 08:02 AM 31.4 (L) 32.0 - 36.0 g/dL Final   04/04/2023 08:44 AM 31.5 (L) 32.0 - 36.0 g/dL Final   12/20/2022 09:20 AM 31.6 (L) 32.0 - 36.0 g/dL Final     RDW   Date/Time Value Ref Range Status   12/13/2023 08:37 AM 13.5 11.5 - 14.5 % Final   09/13/2023 08:02 AM 13.6 11.5 - 14.5 % Final   04/04/2023 08:44 AM 13.3 11.5 - 14.5 % Final   12/20/2022 09:20 AM 13.3 11.5 - 14.5 % Final     Platelets   Date/Time Value Ref Range Status   12/13/2023 08:37  150 - 450 x10*3/uL Final   09/13/2023 08:02  150 - 450 x10E9/L Final   04/04/2023 08:44  150 - 450 x10E9/L Final   12/20/2022 09:20  150 - 450 x10E9/L Final     No results found for: \"MPV\"  Neutrophils %   Date/Time Value Ref Range Status   04/04/2023 08:44 " AM 66.6 40.0 - 80.0 % Final   12/20/2022 09:20 AM 65.6 40.0 - 80.0 % Final   10/27/2022 05:13 PM 53.4 40.0 - 80.0 % Final     Immature Granulocytes %, Automated   Date/Time Value Ref Range Status   04/04/2023 08:44 AM 0.5 0.0 - 0.9 % Final     Comment:      Immature Granulocyte Count (IG) includes promyelocytes,    myelocytes and metamyelocytes but does not include bands.   Percent differential counts (%) should be interpreted in the   context of the absolute cell counts (cells/L).     12/20/2022 09:20 AM 1.3 (H) 0.0 - 0.9 % Final     Comment:      Immature Granulocyte Count (IG) includes promyelocytes,    myelocytes and metamyelocytes but does not include bands.   Percent differential counts (%) should be interpreted in the   context of the absolute cell counts (cells/L).     10/27/2022 05:13 PM 0.7 0.0 - 0.9 % Final     Comment:      Immature Granulocyte Count (IG) includes promyelocytes,    myelocytes and metamyelocytes but does not include bands.   Percent differential counts (%) should be interpreted in the   context of the absolute cell counts (cells/L).       Lymphocytes %   Date/Time Value Ref Range Status   04/04/2023 08:44 AM 20.6 13.0 - 44.0 % Final     Comment:      Percent differential counts (%) should be interpreted in the   context of the absolute cell counts (cells/L).     12/20/2022 09:20 AM 22.3 13.0 - 44.0 % Final   10/27/2022 05:13 PM 31.6 13.0 - 44.0 % Final     Monocytes %   Date/Time Value Ref Range Status   04/04/2023 08:44 AM 9.4 2.0 - 10.0 % Final   12/20/2022 09:20 AM 6.9 2.0 - 10.0 % Final   10/27/2022 05:13 PM 10.2 2.0 - 10.0 % Final     Eosinophils %   Date/Time Value Ref Range Status   04/04/2023 08:44 AM 2.2 0.0 - 6.0 % Final   12/20/2022 09:20 AM 2.8 0.0 - 6.0 % Final   10/27/2022 05:13 PM 2.5 0.0 - 6.0 % Final     Basophils %   Date/Time Value Ref Range Status   04/04/2023 08:44 AM 0.7 0.0 - 2.0 % Final   12/20/2022 09:20 AM 1.1 0.0 - 2.0 % Final   10/27/2022 05:13 PM 1.6 0.0 - 2.0  "% Final     Neutrophils Absolute   Date/Time Value Ref Range Status   04/04/2023 08:44 AM 5.53 1.20 - 7.70 x10E9/L Final   12/20/2022 09:20 AM 6.34 1.20 - 7.70 x10E9/L Final   10/27/2022 05:13 PM 4.40 1.20 - 7.70 x10E9/L Final     No results found for: \"IGABSOL\"  Lymphocytes Absolute   Date/Time Value Ref Range Status   04/04/2023 08:44 AM 1.71 1.20 - 4.80 x10E9/L Final   12/20/2022 09:20 AM 2.16 1.20 - 4.80 x10E9/L Final   10/27/2022 05:13 PM 2.61 1.20 - 4.80 x10E9/L Final     Monocytes Absolute   Date/Time Value Ref Range Status   04/04/2023 08:44 AM 0.78 0.10 - 1.00 x10E9/L Final   12/20/2022 09:20 AM 0.67 0.10 - 1.00 x10E9/L Final   10/27/2022 05:13 PM 0.84 0.10 - 1.00 x10E9/L Final     Eosinophils Absolute   Date/Time Value Ref Range Status   04/04/2023 08:44 AM 0.18 0.00 - 0.70 x10E9/L Final   12/20/2022 09:20 AM 0.27 0.00 - 0.70 x10E9/L Final   10/27/2022 05:13 PM 0.21 0.00 - 0.70 x10E9/L Final     Basophils Absolute   Date/Time Value Ref Range Status   04/04/2023 08:44 AM 0.06 0.00 - 0.10 x10E9/L Final   12/20/2022 09:20 AM 0.11 (H) 0.00 - 0.10 x10E9/L Final   10/27/2022 05:13 PM 0.13 (H) 0.00 - 0.10 x10E9/L Final       No components found for: \"PT\"  aPTT   Date/Time Value Ref Range Status   10/10/2022 11:30 AM 28 26 - 39 sec Final     Comment:       THE APTT IS NO LONGER USED FOR MONITORING     UNFRACTIONATED HEPARIN THERAPY.    FOR MONITORING HEPARIN THERAPY,     USE THE HEPARIN ASSAY.     S/P cystoprostatectomy for a muscle invasive bladder cancer, January 2022. T3a N0  MRD testing negative : RTC 6 months : Continue Signatera.     Offered adjuvant Cisplatin based chemotherapy and /or 1 year of adjuvant Opdivo. He is reluctant due to side effects etc. Post op CT C/A/P and Signatera test negative.      REcommend repeat Signatera every 3 months and CT body every 6 months, if Signatera is negative.  If orders for CT upcoming are valid from other physicians will not order CT for 4/10/23.  Will reorder " Signatera and labs prior to OV with Dr Saenz in 3 months.     04/18/2023: Patient seen today patient diagnosed with muscle invasive bladder cancer January 2022 treated with cystoprostatectomy.  Patient refused chemotherapy.  Patient has since been negative for evidence of disease recurrence as evidenced by CT scanning  and Signatera testing.      We will continue the same.  RTC 6 months.    Assessment/Plan      Patient seen today no complaints will continue minimal disease testing.  Return in 6 months.     Diagnoses and all orders for this visit:  Bladder carcinoma (Multi)  -     Clinic Appointment Request Follow up  -     Clinic Appointment Request Follow Up; CHELSEY SAENZ; Future           Chelsey Saenz MD

## 2024-04-22 ASSESSMENT — ENCOUNTER SYMPTOMS
CONSTITUTIONAL NEGATIVE: 1
RESPIRATORY NEGATIVE: 1
CARDIOVASCULAR NEGATIVE: 1

## 2024-04-23 PROBLEM — K57.92 DIVERTICULITIS: Status: RESOLVED | Noted: 2024-04-23 | Resolved: 2024-04-23

## 2024-04-23 PROBLEM — A41.9 SEPSIS (MULTI): Status: RESOLVED | Noted: 2023-04-19 | Resolved: 2024-04-23

## 2024-04-25 ENCOUNTER — TELEPHONE (OUTPATIENT)
Dept: PRIMARY CARE | Facility: CLINIC | Age: 69
End: 2024-04-25

## 2024-04-25 ENCOUNTER — OFFICE VISIT (OUTPATIENT)
Dept: PRIMARY CARE | Facility: CLINIC | Age: 69
End: 2024-04-25
Payer: MEDICARE

## 2024-04-25 VITALS
BODY MASS INDEX: 36.31 KG/M2 | HEART RATE: 78 BPM | OXYGEN SATURATION: 95 % | DIASTOLIC BLOOD PRESSURE: 72 MMHG | HEIGHT: 69 IN | WEIGHT: 245.13 LBS | SYSTOLIC BLOOD PRESSURE: 142 MMHG | TEMPERATURE: 98.4 F

## 2024-04-25 DIAGNOSIS — I77.810 DILATED AORTIC ROOT (CMS-HCC): ICD-10-CM

## 2024-04-25 DIAGNOSIS — Z00.00 ROUTINE GENERAL MEDICAL EXAMINATION AT A HEALTH CARE FACILITY: ICD-10-CM

## 2024-04-25 DIAGNOSIS — Z93.6 PRESENCE OF UROSTOMY (MULTI): ICD-10-CM

## 2024-04-25 DIAGNOSIS — C67.9: ICD-10-CM

## 2024-04-25 DIAGNOSIS — I10 PRIMARY HYPERTENSION: ICD-10-CM

## 2024-04-25 DIAGNOSIS — I10 ESSENTIAL (PRIMARY) HYPERTENSION: ICD-10-CM

## 2024-04-25 DIAGNOSIS — E11.65 TYPE 2 DIABETES MELLITUS WITH HYPERGLYCEMIA, WITHOUT LONG-TERM CURRENT USE OF INSULIN (MULTI): Primary | ICD-10-CM

## 2024-04-25 DIAGNOSIS — E11.65 TYPE 2 DIABETES MELLITUS WITH HYPERGLYCEMIA, WITHOUT LONG-TERM CURRENT USE OF INSULIN (MULTI): ICD-10-CM

## 2024-04-25 DIAGNOSIS — C67.9 BLADDER CARCINOMA (MULTI): ICD-10-CM

## 2024-04-25 DIAGNOSIS — R60.0 LOCALIZED EDEMA: ICD-10-CM

## 2024-04-25 DIAGNOSIS — J44.9 COPD, MILD (MULTI): ICD-10-CM

## 2024-04-25 DIAGNOSIS — Z00.00 ROUTINE GENERAL MEDICAL EXAMINATION AT HEALTH CARE FACILITY: Primary | ICD-10-CM

## 2024-04-25 DIAGNOSIS — E66.01 OBESITY, MORBID (MULTI): ICD-10-CM

## 2024-04-25 DIAGNOSIS — R73.9 HYPERGLYCEMIA: ICD-10-CM

## 2024-04-25 PROBLEM — D49.4 BLADDER TUMOR: Status: RESOLVED | Noted: 2023-03-07 | Resolved: 2024-04-25

## 2024-04-25 PROBLEM — J96.11 CHRONIC RESPIRATORY FAILURE WITH HYPOXIA (MULTI): Status: RESOLVED | Noted: 2023-04-19 | Resolved: 2024-04-25

## 2024-04-25 PROBLEM — R06.09 DYSPNEA ON EXERTION: Status: RESOLVED | Noted: 2024-01-17 | Resolved: 2024-04-25

## 2024-04-25 PROBLEM — R63.5 WEIGHT GAIN: Status: RESOLVED | Noted: 2024-01-17 | Resolved: 2024-04-25

## 2024-04-25 LAB — POC HEMOGLOBIN A1C: 7.5 % (ref 4.2–6.5)

## 2024-04-25 PROCEDURE — 1036F TOBACCO NON-USER: CPT | Performed by: FAMILY MEDICINE

## 2024-04-25 PROCEDURE — 99214 OFFICE O/P EST MOD 30 MIN: CPT | Performed by: FAMILY MEDICINE

## 2024-04-25 PROCEDURE — 3077F SYST BP >= 140 MM HG: CPT | Performed by: FAMILY MEDICINE

## 2024-04-25 PROCEDURE — 1170F FXNL STATUS ASSESSED: CPT | Performed by: FAMILY MEDICINE

## 2024-04-25 PROCEDURE — 3078F DIAST BP <80 MM HG: CPT | Performed by: FAMILY MEDICINE

## 2024-04-25 PROCEDURE — 1159F MED LIST DOCD IN RCRD: CPT | Performed by: FAMILY MEDICINE

## 2024-04-25 PROCEDURE — 1124F ACP DISCUSS-NO DSCNMKR DOCD: CPT | Performed by: FAMILY MEDICINE

## 2024-04-25 PROCEDURE — G0439 PPPS, SUBSEQ VISIT: HCPCS | Performed by: FAMILY MEDICINE

## 2024-04-25 PROCEDURE — 4010F ACE/ARB THERAPY RXD/TAKEN: CPT | Performed by: FAMILY MEDICINE

## 2024-04-25 PROCEDURE — 83036 HEMOGLOBIN GLYCOSYLATED A1C: CPT | Performed by: FAMILY MEDICINE

## 2024-04-25 PROCEDURE — 1160F RVW MEDS BY RX/DR IN RCRD: CPT | Performed by: FAMILY MEDICINE

## 2024-04-25 PROCEDURE — G0444 DEPRESSION SCREEN ANNUAL: HCPCS | Performed by: FAMILY MEDICINE

## 2024-04-25 RX ORDER — INSULIN PUMP SYRINGE, 3 ML
1 EACH MISCELLANEOUS ONCE
Qty: 1 EACH | Refills: 0 | Status: SHIPPED | OUTPATIENT
Start: 2024-04-25 | End: 2024-04-25

## 2024-04-25 RX ORDER — HYDROCHLOROTHIAZIDE 12.5 MG/1
12.5 TABLET ORAL DAILY
Qty: 90 TABLET | Refills: 1 | Status: SHIPPED | OUTPATIENT
Start: 2024-04-25

## 2024-04-25 RX ORDER — IBUPROFEN 200 MG
CAPSULE ORAL
Qty: 100 STRIP | Refills: 3 | Status: SHIPPED | OUTPATIENT
Start: 2024-04-25

## 2024-04-25 RX ORDER — LANCETS
EACH MISCELLANEOUS
Qty: 100 EACH | Refills: 3 | Status: SHIPPED | OUTPATIENT
Start: 2024-04-25

## 2024-04-25 RX ORDER — LOSARTAN POTASSIUM 100 MG/1
100 TABLET ORAL DAILY
Qty: 90 TABLET | Refills: 1 | Status: SHIPPED | OUTPATIENT
Start: 2024-04-25

## 2024-04-25 ASSESSMENT — ENCOUNTER SYMPTOMS: SHORTNESS OF BREATH: 0

## 2024-04-25 ASSESSMENT — ACTIVITIES OF DAILY LIVING (ADL)
BATHING: INDEPENDENT
MANAGING_FINANCES: INDEPENDENT
TAKING_MEDICATION: INDEPENDENT
GROCERY_SHOPPING: INDEPENDENT
DRESSING: INDEPENDENT
DOING_HOUSEWORK: INDEPENDENT

## 2024-04-25 ASSESSMENT — PATIENT HEALTH QUESTIONNAIRE - PHQ9
2. FEELING DOWN, DEPRESSED OR HOPELESS: NOT AT ALL
SUM OF ALL RESPONSES TO PHQ9 QUESTIONS 1 AND 2: 0
1. LITTLE INTEREST OR PLEASURE IN DOING THINGS: NOT AT ALL

## 2024-04-25 NOTE — TELEPHONE ENCOUNTER
Pt's wife, Anatoly, states they were to check with Medicare and get back to the office in regards to covered glucometers.  Medicare stated they will cover any glucometer as long as the pt is seen every 3 - 6 months for in office follow up.  There were no specific guidelines that were needed to follow. The pharmacy was confirmed as [facility].

## 2024-04-25 NOTE — PATIENT INSTRUCTIONS
Your goal should be to have fasting sugars levels : , 2 hrs post meal <170.  Please call with glucose levels over the next 2 to 3 weeks.      Suggest getting a machine   Call with brands that are covered       Aggressive diet and exercise and weight loss   Recheck in 3 months         Diabetes plan:   With diabetes it is recommended that you follow a low sugar low-carb  ADA diet.  Usually [id number] khalif per day as well as recommended.  Take your medications as directed.  If you have a glucometer and are on oral medications you should be checking your sugars several times per week.  If you are on insulin you should be taking your sugars several times per day.    Your goal should be to have fasting sugars levels : , 2 hrs post meal <170.  You need to see an eye doctor yearly to assess your retinas for diabetic changes yearly.  It is important to keep your weight under control and exercise regularly.  With diabetes you should be seen in the office every 3 months.      Hypertension Plan:  You should check your blood pressures 2-3 times per month.  Your goal should be systolic (upper number ) < 140, and diastolic (bottom number) < 90.  Please periodically inform office of your BP numbers.  You should follow a low salt diet and exercise routinely.  It is important that you keep your weight under control.  With hypertension you should be seen in the office at least twice per year.    Bladder cancer: Continue follow-up with urology and oncology    Advanced directives: I discussed with the patient  advanced care planning including explanation of discussions on advance directives for >15 min.  If patient does not have current up-to-date documents, examples and information provided on living will and power of .  Patient was encouraged to work on getting these documents completed.  [place] has simple advance directives and living will forms that can be used.  Also, you can get more involved forms done through  a  if you desire more detail on your living will plans or more involved plans for power of .

## 2024-04-25 NOTE — TELEPHONE ENCOUNTER
MD Myra Farr MA  Caller: Unspecified (Today,  1:02 PM)  Orders sent in          Previous Messages    Durable Medical Equipment  (Newest Message First)  View All Conversations on this Encounter  Beatrice Francis MD  You16 minutes ago (4:24 PM)       Orders sent in     You  Beatrice Francis MD3 hours ago (1:28 PM)       RX not set up; let me know what you would like to send in and I can set up. Thanks     Nan Ruggiero routed conversation to [facility] Clinical Support Staff3 hours ago (1:04 PM)     Nan Ruggiero3 hours ago (1:04 PM)     VG  Pt's wife, Anatoly, states they were to check with Medicare and get back to the office in regards to covered glucometers.  Medicare stated they will cover any glucometer as long as the pt is seen every 3 - 6 months for in office follow up.  There were no specific guidelines that were needed to follow. The pharmacy was confirmed as [facility].         Note        Anatoly Marquez [phone]  Nan Ruggiero3 hours ago (1:02 PM)   Spoke with pt's wife. [place]

## 2024-04-25 NOTE — PROGRESS NOTES
"Subjective   Reason for Visit: Madan Marquez is an 68 y.o. male here for a Medicare Wellness visit.     Past Medical, Surgical, and Family History reviewed and updated in chart.    Reviewed all medications by prescribing practitioner or clinical pharmacist (such as prescriptions, OTCs, herbal therapies and supplements) and documented in the medical record.        No diabetes meds in past   Not checking glucose     Bowels few times per day     Hypertension  : Patient is taking blood pressure medications as directed.  Blood pressures have been averaging:  Pt denies Chest Pain or Shortness of Breath      COPD: Stable, seeing pulmonology  Not needing O2         Bladder resection status post bladder cancer, tumor markers negative, recent heme-onc visit 4/18, stable  Signatera test was normal           Right pinky finger getting stuck at times not to some  Right          Patient Care Team:  Beatrice Francis MD as PCP - General (Family Medicine)  Beatrice Francis MD as PCP - [facility]CO Attributed Provider  Chelsey Valencia MD as Referring Physician (Hematology and Oncology)     Review of Systems   Respiratory:  Negative for shortness of breath.    Cardiovascular:  Negative for chest pain.       Objective   Vitals:  /72   Pulse 78   Temp 36.9 °C (98.4 °F)   Ht 1.753 m (5' 9\")   Wt 111 kg (245 lb 2 oz)   SpO2 95%   BMI 36.20 kg/m²       Physical Exam  Constitutional:       General: He is not in acute distress.     Appearance: Normal appearance.   HENT:      Head: Normocephalic and atraumatic.      Right Ear: Tympanic membrane, ear canal and external ear normal.      Left Ear: Tympanic membrane, ear canal and external ear normal.      Mouth/Throat:      Mouth: Mucous membranes are moist.   Eyes:      Extraocular Movements: Extraocular movements intact.      Conjunctiva/sclera: Conjunctivae normal.      Pupils: Pupils are equal, round, and reactive to light.   Cardiovascular:      Rate and Rhythm: Normal " rate and regular rhythm.      Heart sounds: No murmur heard.     Comments: Venous stasis changes  Trace ankle edema  Pulmonary:      Effort: Pulmonary effort is normal.      Breath sounds: Normal breath sounds.   Abdominal:      General: Bowel sounds are normal.      Palpations: Abdomen is soft.      Comments: Ostomy bag intact  Surrounding skin looks okay  Soft easily reducible umbilical hernia   Musculoskeletal:         General: Normal range of motion.      Cervical back: No rigidity.   Lymphadenopathy:      Cervical: No cervical adenopathy.   Skin:     General: Skin is warm and dry.      Findings: No rash.   Neurological:      General: No focal deficit present.      Mental Status: He is alert and oriented to person, place, and time.      Cranial Nerves: No cranial nerve deficit.      Gait: Gait normal.   Psychiatric:         Mood and Affect: Mood normal.         Behavior: Behavior normal.         Assessment/Plan   Problem List Items Addressed This Visit       COPD, mild (Multi)    Relevant Orders    Comprehensive Metabolic Panel    CBC    Lipid Panel    Dilated aortic root (CMS-HCC)    Relevant Orders    Comprehensive Metabolic Panel    CBC    Lipid Panel    Hyperglycemia    Relevant Orders    POCT glycosylated hemoglobin (Hb A1C) manually resulted (Completed)    Comprehensive Metabolic Panel    CBC    Lipid Panel    Hypertension    Relevant Orders    Comprehensive Metabolic Panel    CBC    Lipid Panel    Presence of urostomy (Multi)    Relevant Orders    Comprehensive Metabolic Panel    CBC    Lipid Panel    Bladder carcinoma (Multi)    Relevant Orders    Comprehensive Metabolic Panel    CBC    Lipid Panel    Recurrent bladder transitional cell carcinoma (Multi)    Relevant Orders    Comprehensive Metabolic Panel    CBC    Lipid Panel    Type 2 diabetes mellitus (Multi)    Relevant Orders    Comprehensive Metabolic Panel    CBC    Lipid Panel    Obesity, morbid (Multi)    Relevant Orders    Comprehensive  Metabolic Panel    CBC    Lipid Panel     Other Visit Diagnoses       Routine general medical examination at health care facility    -  Primary    Relevant Orders    Comprehensive Metabolic Panel    CBC    Lipid Panel    Localized edema        Relevant Medications    hydroCHLOROthiazide (Microzide) 12.5 mg tablet    Other Relevant Orders    Comprehensive Metabolic Panel    CBC    Lipid Panel    Essential (primary) hypertension        Relevant Medications    hydroCHLOROthiazide (Microzide) 12.5 mg tablet    losartan (Cozaar) 100 mg tablet    Other Relevant Orders    Comprehensive Metabolic Panel    CBC    Lipid Panel    Routine general medical examination at a health care facility        Relevant Orders    Comprehensive Metabolic Panel    CBC    Lipid Panel

## 2024-05-07 ENCOUNTER — LAB (OUTPATIENT)
Dept: LAB | Facility: LAB | Age: 69
End: 2024-05-07
Payer: MEDICARE

## 2024-05-07 DIAGNOSIS — I10 ESSENTIAL (PRIMARY) HYPERTENSION: ICD-10-CM

## 2024-05-07 DIAGNOSIS — Z00.00 ROUTINE GENERAL MEDICAL EXAMINATION AT HEALTH CARE FACILITY: ICD-10-CM

## 2024-05-07 DIAGNOSIS — R73.9 HYPERGLYCEMIA: ICD-10-CM

## 2024-05-07 DIAGNOSIS — Z93.6 PRESENCE OF UROSTOMY (MULTI): ICD-10-CM

## 2024-05-07 DIAGNOSIS — C67.9: ICD-10-CM

## 2024-05-07 DIAGNOSIS — R60.0 LOCALIZED EDEMA: ICD-10-CM

## 2024-05-07 DIAGNOSIS — I10 PRIMARY HYPERTENSION: ICD-10-CM

## 2024-05-07 DIAGNOSIS — Z00.00 ROUTINE GENERAL MEDICAL EXAMINATION AT A HEALTH CARE FACILITY: ICD-10-CM

## 2024-05-07 DIAGNOSIS — I77.810 DILATED AORTIC ROOT (CMS-HCC): ICD-10-CM

## 2024-05-07 DIAGNOSIS — C67.9 BLADDER CARCINOMA (MULTI): ICD-10-CM

## 2024-05-07 DIAGNOSIS — J44.9 COPD, MILD (MULTI): ICD-10-CM

## 2024-05-07 DIAGNOSIS — E66.01 OBESITY, MORBID (MULTI): ICD-10-CM

## 2024-05-07 DIAGNOSIS — E11.65 TYPE 2 DIABETES MELLITUS WITH HYPERGLYCEMIA, WITHOUT LONG-TERM CURRENT USE OF INSULIN (MULTI): ICD-10-CM

## 2024-05-07 LAB
ALBUMIN SERPL BCP-MCNC: 4.2 G/DL (ref 3.4–5)
ALP SERPL-CCNC: 59 U/L (ref 33–136)
ALT SERPL W P-5'-P-CCNC: 36 U/L (ref 10–52)
ANION GAP SERPL CALC-SCNC: 14 MMOL/L (ref 10–20)
AST SERPL W P-5'-P-CCNC: 19 U/L (ref 9–39)
BILIRUB SERPL-MCNC: 0.5 MG/DL (ref 0–1.2)
BUN SERPL-MCNC: 23 MG/DL (ref 6–23)
CALCIUM SERPL-MCNC: 9.6 MG/DL (ref 8.6–10.3)
CHLORIDE SERPL-SCNC: 105 MMOL/L (ref 98–107)
CHOLEST SERPL-MCNC: 165 MG/DL (ref 0–199)
CHOLESTEROL/HDL RATIO: 5.3
CO2 SERPL-SCNC: 25 MMOL/L (ref 21–32)
CREAT SERPL-MCNC: 1.33 MG/DL (ref 0.5–1.3)
EGFRCR SERPLBLD CKD-EPI 2021: 58 ML/MIN/1.73M*2
ERYTHROCYTE [DISTWIDTH] IN BLOOD BY AUTOMATED COUNT: 13.3 % (ref 11.5–14.5)
GLUCOSE SERPL-MCNC: 114 MG/DL (ref 74–99)
HCT VFR BLD AUTO: 47.9 % (ref 41–52)
HDLC SERPL-MCNC: 31.4 MG/DL
HGB BLD-MCNC: 15.2 G/DL (ref 13.5–17.5)
LDLC SERPL CALC-MCNC: 80 MG/DL
MCH RBC QN AUTO: 29.6 PG (ref 26–34)
MCHC RBC AUTO-ENTMCNC: 31.7 G/DL (ref 32–36)
MCV RBC AUTO: 93 FL (ref 80–100)
NON HDL CHOLESTEROL: 134 MG/DL (ref 0–149)
NRBC BLD-RTO: 0 /100 WBCS (ref 0–0)
PLATELET # BLD AUTO: 299 X10*3/UL (ref 150–450)
POTASSIUM SERPL-SCNC: 4.6 MMOL/L (ref 3.5–5.3)
PROT SERPL-MCNC: 7.3 G/DL (ref 6.4–8.2)
RBC # BLD AUTO: 5.14 X10*6/UL (ref 4.5–5.9)
SODIUM SERPL-SCNC: 139 MMOL/L (ref 136–145)
TRIGL SERPL-MCNC: 269 MG/DL (ref 0–149)
VLDL: 54 MG/DL (ref 0–40)
WBC # BLD AUTO: 9.1 X10*3/UL (ref 4.4–11.3)

## 2024-05-07 PROCEDURE — 80053 COMPREHEN METABOLIC PANEL: CPT

## 2024-05-07 PROCEDURE — 36415 COLL VENOUS BLD VENIPUNCTURE: CPT

## 2024-05-07 PROCEDURE — 85027 COMPLETE CBC AUTOMATED: CPT

## 2024-05-07 PROCEDURE — 80061 LIPID PANEL: CPT

## 2024-05-08 NOTE — RESULT ENCOUNTER NOTE
Sugar is elevated, triglycerides are elevated: Weight loss is recommended, low-carb low sugar diet, follow-up 3 months to recheck

## 2024-05-29 DIAGNOSIS — C67.9 BLADDER CARCINOMA (MULTI): ICD-10-CM

## 2024-06-04 ENCOUNTER — OFFICE VISIT (OUTPATIENT)
Dept: PULMONOLOGY | Facility: CLINIC | Age: 69
End: 2024-06-04
Payer: MEDICARE

## 2024-06-04 VITALS
OXYGEN SATURATION: 92 % | DIASTOLIC BLOOD PRESSURE: 70 MMHG | BODY MASS INDEX: 35.15 KG/M2 | SYSTOLIC BLOOD PRESSURE: 115 MMHG | RESPIRATION RATE: 16 BRPM | WEIGHT: 238 LBS | HEART RATE: 97 BPM

## 2024-06-04 DIAGNOSIS — Z87.891 EX-CIGARETTE SMOKER: ICD-10-CM

## 2024-06-04 DIAGNOSIS — R91.8 LUNG NODULES: Primary | ICD-10-CM

## 2024-06-04 DIAGNOSIS — J44.9 COPD, MILD (MULTI): ICD-10-CM

## 2024-06-04 PROCEDURE — 99213 OFFICE O/P EST LOW 20 MIN: CPT | Performed by: INTERNAL MEDICINE

## 2024-06-04 PROCEDURE — 1126F AMNT PAIN NOTED NONE PRSNT: CPT | Performed by: INTERNAL MEDICINE

## 2024-06-04 PROCEDURE — 3074F SYST BP LT 130 MM HG: CPT | Performed by: INTERNAL MEDICINE

## 2024-06-04 PROCEDURE — 4010F ACE/ARB THERAPY RXD/TAKEN: CPT | Performed by: INTERNAL MEDICINE

## 2024-06-04 PROCEDURE — 1160F RVW MEDS BY RX/DR IN RCRD: CPT | Performed by: INTERNAL MEDICINE

## 2024-06-04 PROCEDURE — 3048F LDL-C <100 MG/DL: CPT | Performed by: INTERNAL MEDICINE

## 2024-06-04 PROCEDURE — 1036F TOBACCO NON-USER: CPT | Performed by: INTERNAL MEDICINE

## 2024-06-04 PROCEDURE — 3078F DIAST BP <80 MM HG: CPT | Performed by: INTERNAL MEDICINE

## 2024-06-04 PROCEDURE — 1159F MED LIST DOCD IN RCRD: CPT | Performed by: INTERNAL MEDICINE

## 2024-06-04 ASSESSMENT — COLUMBIA-SUICIDE SEVERITY RATING SCALE - C-SSRS
1. IN THE PAST MONTH, HAVE YOU WISHED YOU WERE DEAD OR WISHED YOU COULD GO TO SLEEP AND NOT WAKE UP?: NO
2. HAVE YOU ACTUALLY HAD ANY THOUGHTS OF KILLING YOURSELF?: NO
6. HAVE YOU EVER DONE ANYTHING, STARTED TO DO ANYTHING, OR PREPARED TO DO ANYTHING TO END YOUR LIFE?: NO

## 2024-06-04 ASSESSMENT — PATIENT HEALTH QUESTIONNAIRE - PHQ9
SUM OF ALL RESPONSES TO PHQ9 QUESTIONS 1 AND 2: 0
2. FEELING DOWN, DEPRESSED OR HOPELESS: NOT AT ALL
1. LITTLE INTEREST OR PLEASURE IN DOING THINGS: NOT AT ALL

## 2024-06-04 ASSESSMENT — ENCOUNTER SYMPTOMS
CARDIOVASCULAR NEGATIVE: 1
RESPIRATORY NEGATIVE: 1
COUGH: 0
NEUROLOGICAL NEGATIVE: 1
PSYCHIATRIC NEGATIVE: 1
CHILLS: 0
FEVER: 0
DEPRESSION: 0
GASTROINTESTINAL NEGATIVE: 1

## 2024-06-04 ASSESSMENT — PAIN SCALES - GENERAL: PAINLEVEL: 0-NO PAIN

## 2024-06-04 NOTE — ASSESSMENT & PLAN NOTE
Former smoker - 100 pack year history, quit in 2019. Cont lung Ca screening when pt is not getting routine CTs by oncology.  Cont until turns 76.

## 2024-06-04 NOTE — ASSESSMENT & PLAN NOTE
mild obstruction 11/2022. Continue to observe off Anoro (tooth pain? But now with dentures)  Repeat PFT in a year.

## 2024-06-04 NOTE — PROGRESS NOTES
"Subjective   Patient ID: Madan Marquez \"Carmelita" is a 68 y.o. male who presents for Lung Eval.  h/o COPD here for f/u. Mild obstruction (11/2022). Stable nodules on CT. Thinks breathing has been ok. Had burning teeth with albuterol?/ANoro/Incruse not clear which but now had all teet pulled.  No fevers, chills or cough. Not on an inhaler. ET is limited by hip pain.         Review of Systems   Constitutional:  Negative for chills and fever.   Respiratory: Negative.  Negative for cough.    Cardiovascular: Negative.    Gastrointestinal: Negative.    Skin:  Negative for rash.   Neurological: Negative.    Psychiatric/Behavioral: Negative.     All other systems reviewed and are negative.      Objective   Physical Exam  Vitals reviewed.   Constitutional:       Appearance: Normal appearance.   HENT:      Head: Normocephalic and atraumatic.   Eyes:      Extraocular Movements: Extraocular movements intact.   Cardiovascular:      Rate and Rhythm: Normal rate and regular rhythm.      Heart sounds: Normal heart sounds.   Pulmonary:      Effort: Pulmonary effort is normal.      Breath sounds: Normal breath sounds.   Abdominal:      Palpations: Abdomen is soft.      Tenderness: There is no abdominal tenderness.   Musculoskeletal:      Cervical back: Normal range of motion.   Skin:     General: Skin is warm.   Neurological:      General: No focal deficit present.      Mental Status: He is alert and oriented to person, place, and time. Mental status is at baseline.   Psychiatric:         Mood and Affect: Mood normal.         Behavior: Behavior normal.         Assessment/Plan   Problem List Items Addressed This Visit       COPD, mild (Multi)     mild obstruction 11/2022. Continue to observe off Anoro (tooth pain? But now with dentures)  Repeat PFT in a year.         Relevant Orders    Complete Pulmonary Function Test Pre/Post Bronchodialator (Spirometry Pre/Post/DLCO/Lung Volumes)    Lung nodules - Primary     Stable on recent " CT.  Has another coming up.         Ex-cigarette smoker     Former smoker - 100 pack year history, quit in 2019. Cont lung Ca screening when pt is not getting routine CTs by oncology.  Cont until turns 76.          RTC in 1 year    Time Spent  Prep time on day of patient encounter: 5 minutes  Time spent directly with patient, family or caregiver: 10 minutes  Additional Time Spent on Patient Care Activities: 0 minutes  Documentation Time: 5 minutes  Other Time Spent: 0 minutes  Total: 20 minutes        Franco Tomlin MD 06/04/24 4:25 PM

## 2024-06-10 ENCOUNTER — LAB (OUTPATIENT)
Dept: LAB | Facility: HOSPITAL | Age: 69
End: 2024-06-10
Payer: MEDICARE

## 2024-06-10 DIAGNOSIS — C67.9 BLADDER CARCINOMA (MULTI): ICD-10-CM

## 2024-06-10 PROCEDURE — 36415 COLL VENOUS BLD VENIPUNCTURE: CPT

## 2024-06-20 ENCOUNTER — HOSPITAL ENCOUNTER (OUTPATIENT)
Dept: RADIOLOGY | Facility: HOSPITAL | Age: 69
Discharge: HOME | End: 2024-06-20
Payer: MEDICARE

## 2024-06-20 ENCOUNTER — PATIENT MESSAGE (OUTPATIENT)
Dept: HEMATOLOGY/ONCOLOGY | Facility: CLINIC | Age: 69
End: 2024-06-20

## 2024-06-20 DIAGNOSIS — C67.9 BLADDER CARCINOMA (MULTI): ICD-10-CM

## 2024-06-20 PROCEDURE — 71250 CT THORAX DX C-: CPT

## 2024-06-20 PROCEDURE — 71250 CT THORAX DX C-: CPT | Performed by: RADIOLOGY

## 2024-06-20 PROCEDURE — 76377 3D RENDER W/INTRP POSTPROCES: CPT

## 2024-06-20 PROCEDURE — 2550000001 HC RX 255 CONTRASTS: Performed by: STUDENT IN AN ORGANIZED HEALTH CARE EDUCATION/TRAINING PROGRAM

## 2024-06-28 LAB — SCAN RESULT: NORMAL

## 2024-07-12 DIAGNOSIS — Z93.6 OTHER ARTIFICIAL OPENINGS OF URINARY TRACT STATUS (MULTI): ICD-10-CM

## 2024-07-15 ENCOUNTER — APPOINTMENT (OUTPATIENT)
Dept: UROLOGY | Facility: CLINIC | Age: 69
End: 2024-07-15
Payer: MEDICARE

## 2024-07-15 DIAGNOSIS — K43.5 PARASTOMAL HERNIA OF ILEAL CONDUIT: ICD-10-CM

## 2024-07-15 DIAGNOSIS — C65.1 MALIGNANT TUMOR OF RENAL PELVIS, RIGHT (MULTI): ICD-10-CM

## 2024-07-15 DIAGNOSIS — K59.00 CONSTIPATION, UNSPECIFIED CONSTIPATION TYPE: ICD-10-CM

## 2024-07-15 DIAGNOSIS — C67.9 MALIGNANT NEOPLASM OF URINARY BLADDER, UNSPECIFIED SITE (MULTI): Primary | ICD-10-CM

## 2024-07-15 PROCEDURE — G2211 COMPLEX E/M VISIT ADD ON: HCPCS | Performed by: STUDENT IN AN ORGANIZED HEALTH CARE EDUCATION/TRAINING PROGRAM

## 2024-07-15 PROCEDURE — 3048F LDL-C <100 MG/DL: CPT | Performed by: STUDENT IN AN ORGANIZED HEALTH CARE EDUCATION/TRAINING PROGRAM

## 2024-07-15 PROCEDURE — 4010F ACE/ARB THERAPY RXD/TAKEN: CPT | Performed by: STUDENT IN AN ORGANIZED HEALTH CARE EDUCATION/TRAINING PROGRAM

## 2024-07-15 PROCEDURE — 99214 OFFICE O/P EST MOD 30 MIN: CPT | Performed by: STUDENT IN AN ORGANIZED HEALTH CARE EDUCATION/TRAINING PROGRAM

## 2024-07-15 RX ORDER — CEPHALEXIN 250 MG/1
250 CAPSULE ORAL DAILY
Qty: 100 CAPSULE | Refills: 1 | Status: SHIPPED | OUTPATIENT
Start: 2024-07-15

## 2024-07-15 NOTE — PROGRESS NOTES
Subjective   Patient ID: Kevin Marquez is a 68 y.o. male.    HPI  67 yo male F/U s/p open radical cystoprostatectomy and ileal conduit diversion January 7, 2022.     No complaints generally, lost some weight intentionally. Overall doing great. Presents to review most recent imaging.     He is on Keflex 250 mg daily for prophylaxis. Doing well on this.      CT chest wo IV contrast  Result Date: 6/21/2024    1. No acute pathologic findings are identified radiographically. 2. Stable prominence of the mediastinal lymph nodes. 3. Emphysematous change pulmonary parenchyma. 4. No evidence for tumor recurrence within the chest.       CT urography w 3D volume rendered imaging  Result Date: 6/21/2024    1. Diffuse fatty infiltration of the liver. 2. Cholelithiasis. 3. Status post urinary cystectomy with right lower quadrant ileal loop a peristomal mesenteric fat containing hernia and umbilical mesenteric fat containing hernia. 4. There is no interval change when compared to the previous examination.        Review of Systems  A complete review of systems was performed. All systems are noted to be negative unless indicated in the history of present illness, impression, active problem list, or past histories.     Objective   Physical Exam  NAD, alert, oriented  Abdomen: right urostomy normal-looking, clear urine, parastomal hernia nontender, small umbilical hernia, abdomen soft     Assessment/Plan   67 yo male F/U s/p open radical cystoprostatectomy and ileal conduit diversion January 7, 2022.     No complaints generally, lost some weight intentionally. Overall doing great. Presents to review most recent imaging.     I personally reviewed the CT chest. No acute pathologic findings are identified radiographically. Stable prominence of the mediastinal lymph nodes.  Emphysematous change pulmonary parenchyma. No evidence for tumor recurrence within the chest. I also reviewed the CT urogram. Diffuse fatty infiltration of the  liver. Cholelithiasis. Status post urinary cystectomy with right lower quadrant ileal loop a peristomal mesenteric fat containing hernia and umbilical mesenteric fat containing hernia. There is no interval change when compared to the previous examination.        We discussed considering wearing a belt to maintain hernia.     We discussed repeating CT chest and CTU in January 2025 at the 3 year devonte. At this point, we might continue with yearly imaging.       He is on Keflex 250 mg daily for prophylaxis. Doing well on this.     Plan:  CBC, CMP November 2024.   CT chest w/o contrast, CT urogram, December 2024.   Continue Keflex 250 mg once a day.   FUV Dec 2024-Jan 2025.     Diagnoses and all orders for this visit:  Malignant neoplasm of urinary bladder, unspecified site (Multi)  -     CT chest wo IV contrast; Future  -     CT urography w 3D volume rendered imaging; Future  -     CBC; Future  -     Comprehensive Metabolic Panel; Future  Parastomal hernia of ileal conduit  Constipation, unspecified constipation type  Malignant tumor of renal pelvis, right (Multi)  -     CT chest wo IV contrast; Future    Scribe Attestation  By signing my name below, IDanika Scribe attest that this documentation has been prepared under the direction and in the presence of Carlos Manuel Ward MD.

## 2024-08-20 ENCOUNTER — HOSPITAL ENCOUNTER (OUTPATIENT)
Dept: RADIOLOGY | Facility: CLINIC | Age: 69
Discharge: HOME | End: 2024-08-20
Payer: MEDICARE

## 2024-08-20 ENCOUNTER — OFFICE VISIT (OUTPATIENT)
Dept: PRIMARY CARE | Facility: CLINIC | Age: 69
End: 2024-08-20
Payer: MEDICARE

## 2024-08-20 VITALS
DIASTOLIC BLOOD PRESSURE: 73 MMHG | BODY MASS INDEX: 35.55 KG/M2 | SYSTOLIC BLOOD PRESSURE: 128 MMHG | HEIGHT: 69 IN | TEMPERATURE: 98.2 F | WEIGHT: 240 LBS | OXYGEN SATURATION: 93 % | HEART RATE: 84 BPM

## 2024-08-20 DIAGNOSIS — M25.572 ACUTE LEFT ANKLE PAIN: ICD-10-CM

## 2024-08-20 DIAGNOSIS — M25.562 LEFT KNEE PAIN, UNSPECIFIED CHRONICITY: ICD-10-CM

## 2024-08-20 DIAGNOSIS — M25.562 ACUTE PAIN OF LEFT KNEE: ICD-10-CM

## 2024-08-20 DIAGNOSIS — R60.0 LEG EDEMA, LEFT: ICD-10-CM

## 2024-08-20 DIAGNOSIS — M25.562 ACUTE PAIN OF LEFT KNEE: Primary | ICD-10-CM

## 2024-08-20 PROCEDURE — 73564 X-RAY EXAM KNEE 4 OR MORE: CPT | Mod: LEFT SIDE | Performed by: RADIOLOGY

## 2024-08-20 PROCEDURE — 73590 X-RAY EXAM OF LOWER LEG: CPT | Mod: LT

## 2024-08-20 PROCEDURE — 73610 X-RAY EXAM OF ANKLE: CPT | Mod: LT

## 2024-08-20 PROCEDURE — 73590 X-RAY EXAM OF LOWER LEG: CPT | Mod: LEFT SIDE | Performed by: RADIOLOGY

## 2024-08-20 PROCEDURE — 99214 OFFICE O/P EST MOD 30 MIN: CPT | Performed by: FAMILY MEDICINE

## 2024-08-20 PROCEDURE — 73610 X-RAY EXAM OF ANKLE: CPT | Mod: LEFT SIDE | Performed by: RADIOLOGY

## 2024-08-20 PROCEDURE — 73564 X-RAY EXAM KNEE 4 OR MORE: CPT | Mod: LT

## 2024-08-20 RX ORDER — PREDNISONE 10 MG/1
TABLET ORAL
Qty: 26 TABLET | Refills: 0 | Status: SHIPPED | OUTPATIENT
Start: 2024-08-20 | End: 2024-08-31

## 2024-08-20 ASSESSMENT — PATIENT HEALTH QUESTIONNAIRE - PHQ9
2. FEELING DOWN, DEPRESSED OR HOPELESS: NOT AT ALL
1. LITTLE INTEREST OR PLEASURE IN DOING THINGS: NOT AT ALL
SUM OF ALL RESPONSES TO PHQ9 QUESTIONS 1 AND 2: 0

## 2024-08-20 NOTE — PROGRESS NOTES
"Subjective   Patient ID: Kevin Marquez is a 68 y.o. male who presents for left Ankle Injury  and left knee pain. Sx's onset last Thursday; rolled his ankle on an egg sized stone and was kneeling on a stone surface working. Taking Aspirin and elevating his leg.    Pain left knee and ankle   Pain with walking   Giving out with knee       No hx of issues   No icing            Review of Systems    Objective   /73   Pulse 84   Temp 36.8 °C (98.2 °F)   Ht 1.753 m (5' 9\")   Wt 109 kg (240 lb)   SpO2 93%   BMI 35.44 kg/m²     Physical Exam  Constitutional:       Appearance: Normal appearance.   Musculoskeletal:      Comments: Antalgic gait  Not able to put much weight on his left lower extremity  Knee with some suprapatellar edema  Tenderness along the tibial tuberosity  Tenderness along the lateral and medial joint line  Difficulty to assess stability based on pain with exam  No substantial tenderness along the tib-fib, (however, patient has diminished sensation due to chronic neuropathy)  Edema 1+ around the ankle with some tenderness along the lateral malleolus  No bruising   Skin:     Comments: Venous stasis changes lower extremities   Neurological:      Mental Status: He is alert.   Psychiatric:         Mood and Affect: Mood normal.         Behavior: Behavior normal.         Assessment/Plan   Problem List Items Addressed This Visit    None  Visit Diagnoses         Codes    Acute pain of left knee    -  Primary M25.562    Relevant Medications    predniSONE (Deltasone) 10 mg tablet    Other Relevant Orders    XR ankle left 3+ views    XR knee left 3 views    XR tibia fibula left 2 views    Acute left ankle pain     M25.572    Relevant Medications    predniSONE (Deltasone) 10 mg tablet    Other Relevant Orders    XR ankle left 3+ views    XR knee left 3 views    XR tibia fibula left 2 views    Leg edema, left     R60.0    Relevant Medications    predniSONE (Deltasone) 10 mg tablet    Other Relevant Orders "    XR ankle left 3+ views    XR knee left 3 views    XR tibia fibula left 2 views    Left knee pain, unspecified chronicity     M25.562    Relevant Orders    XR ankle left 3+ views    XR knee left 3 views    XR tibia fibula left 2 views

## 2024-08-30 DIAGNOSIS — C67.9 BLADDER CARCINOMA (MULTI): ICD-10-CM

## 2024-09-03 ENCOUNTER — APPOINTMENT (OUTPATIENT)
Dept: RADIOLOGY | Facility: HOSPITAL | Age: 69
DRG: 872 | End: 2024-09-03
Payer: MEDICARE

## 2024-09-03 ENCOUNTER — APPOINTMENT (OUTPATIENT)
Dept: CARDIOLOGY | Facility: HOSPITAL | Age: 69
DRG: 872 | End: 2024-09-03
Payer: MEDICARE

## 2024-09-03 ENCOUNTER — TELEPHONE (OUTPATIENT)
Dept: PRIMARY CARE | Facility: CLINIC | Age: 69
End: 2024-09-03
Payer: MEDICARE

## 2024-09-03 ENCOUNTER — HOSPITAL ENCOUNTER (INPATIENT)
Facility: HOSPITAL | Age: 69
LOS: 1 days | Discharge: HOME | DRG: 872 | End: 2024-09-04
Attending: STUDENT IN AN ORGANIZED HEALTH CARE EDUCATION/TRAINING PROGRAM | Admitting: STUDENT IN AN ORGANIZED HEALTH CARE EDUCATION/TRAINING PROGRAM
Payer: MEDICARE

## 2024-09-03 ENCOUNTER — LAB (OUTPATIENT)
Dept: LAB | Facility: HOSPITAL | Age: 69
End: 2024-09-03
Payer: MEDICARE

## 2024-09-03 DIAGNOSIS — N30.90 CYSTITIS: ICD-10-CM

## 2024-09-03 DIAGNOSIS — M25.562 ACUTE PAIN OF LEFT KNEE: ICD-10-CM

## 2024-09-03 DIAGNOSIS — C67.9 BLADDER CARCINOMA (MULTI): ICD-10-CM

## 2024-09-03 DIAGNOSIS — A41.9 SEPSIS, DUE TO UNSPECIFIED ORGANISM, UNSPECIFIED WHETHER ACUTE ORGAN DYSFUNCTION PRESENT (MULTI): Primary | ICD-10-CM

## 2024-09-03 DIAGNOSIS — R50.9 FEVER AND CHILLS: ICD-10-CM

## 2024-09-03 LAB
ALBUMIN SERPL BCP-MCNC: 4.2 G/DL (ref 3.4–5)
ALP SERPL-CCNC: 58 U/L (ref 33–136)
ALT SERPL W P-5'-P-CCNC: 23 U/L (ref 10–52)
ANION GAP SERPL CALC-SCNC: 13 MMOL/L (ref 10–20)
APPEARANCE UR: ABNORMAL
APTT PPP: 28 SECONDS (ref 27–38)
AST SERPL W P-5'-P-CCNC: 15 U/L (ref 9–39)
BACTERIA #/AREA URNS AUTO: ABNORMAL /HPF
BASOPHILS # BLD AUTO: 0.1 X10*3/UL (ref 0–0.1)
BASOPHILS NFR BLD AUTO: 0.5 %
BILIRUB SERPL-MCNC: 0.8 MG/DL (ref 0–1.2)
BILIRUB UR STRIP.AUTO-MCNC: NEGATIVE MG/DL
BUN SERPL-MCNC: 33 MG/DL (ref 6–23)
CALCIUM SERPL-MCNC: 9.9 MG/DL (ref 8.6–10.3)
CHLORIDE SERPL-SCNC: 101 MMOL/L (ref 98–107)
CO2 SERPL-SCNC: 26 MMOL/L (ref 21–32)
COLOR UR: ABNORMAL
CREAT SERPL-MCNC: 1.29 MG/DL (ref 0.5–1.3)
CRP SERPL-MCNC: 1.04 MG/DL
EGFRCR SERPLBLD CKD-EPI 2021: 60 ML/MIN/1.73M*2
EOSINOPHIL # BLD AUTO: 0.17 X10*3/UL (ref 0–0.7)
EOSINOPHIL NFR BLD AUTO: 0.9 %
ERYTHROCYTE [DISTWIDTH] IN BLOOD BY AUTOMATED COUNT: 13.3 % (ref 11.5–14.5)
ERYTHROCYTE [SEDIMENTATION RATE] IN BLOOD BY WESTERGREN METHOD: 22 MM/H (ref 0–20)
FLUAV RNA RESP QL NAA+PROBE: NOT DETECTED
FLUBV RNA RESP QL NAA+PROBE: NOT DETECTED
GLUCOSE BLD MANUAL STRIP-MCNC: 108 MG/DL (ref 74–99)
GLUCOSE SERPL-MCNC: 162 MG/DL (ref 74–99)
GLUCOSE UR STRIP.AUTO-MCNC: NORMAL MG/DL
HCT VFR BLD AUTO: 46.3 % (ref 41–52)
HGB BLD-MCNC: 15.1 G/DL (ref 13.5–17.5)
IMM GRANULOCYTES # BLD AUTO: 0.16 X10*3/UL (ref 0–0.7)
IMM GRANULOCYTES NFR BLD AUTO: 0.9 % (ref 0–0.9)
INR PPP: 1 (ref 0.9–1.1)
KETONES UR STRIP.AUTO-MCNC: NEGATIVE MG/DL
LACTATE SERPL-SCNC: 1.9 MMOL/L (ref 0.4–2)
LACTATE SERPL-SCNC: 2.4 MMOL/L (ref 0.4–2)
LEUKOCYTE ESTERASE UR QL STRIP.AUTO: NEGATIVE
LYMPHOCYTES # BLD AUTO: 1.5 X10*3/UL (ref 1.2–4.8)
LYMPHOCYTES NFR BLD AUTO: 8.2 %
MAGNESIUM SERPL-MCNC: 1.8 MG/DL (ref 1.6–2.4)
MCH RBC QN AUTO: 29.7 PG (ref 26–34)
MCHC RBC AUTO-ENTMCNC: 32.6 G/DL (ref 32–36)
MCV RBC AUTO: 91 FL (ref 80–100)
MONOCYTES # BLD AUTO: 1.17 X10*3/UL (ref 0.1–1)
MONOCYTES NFR BLD AUTO: 6.4 %
MUCOUS THREADS #/AREA URNS AUTO: ABNORMAL /LPF
NEUTROPHILS # BLD AUTO: 15.25 X10*3/UL (ref 1.2–7.7)
NEUTROPHILS NFR BLD AUTO: 83.1 %
NITRITE UR QL STRIP.AUTO: ABNORMAL
NRBC BLD-RTO: 0 /100 WBCS (ref 0–0)
PH UR STRIP.AUTO: 7.5 [PH]
PLATELET # BLD AUTO: 279 X10*3/UL (ref 150–450)
POTASSIUM SERPL-SCNC: 3.9 MMOL/L (ref 3.5–5.3)
PROT SERPL-MCNC: 7.8 G/DL (ref 6.4–8.2)
PROT UR STRIP.AUTO-MCNC: ABNORMAL MG/DL
PROTHROMBIN TIME: 11.7 SECONDS (ref 9.8–12.8)
RBC # BLD AUTO: 5.08 X10*6/UL (ref 4.5–5.9)
RBC # UR STRIP.AUTO: NEGATIVE /UL
RBC #/AREA URNS AUTO: ABNORMAL /HPF
SARS-COV-2 RNA RESP QL NAA+PROBE: NOT DETECTED
SODIUM SERPL-SCNC: 136 MMOL/L (ref 136–145)
SP GR UR STRIP.AUTO: 1.02
TRI-PHOS CRY #/AREA UR COMP ASSIST: ABNORMAL /HPF
URATE CRY #/AREA UR COMP ASSIST: ABNORMAL /HPF
URATE SERPL-MCNC: 8.7 MG/DL (ref 4–7.5)
UROBILINOGEN UR STRIP.AUTO-MCNC: NORMAL MG/DL
WBC # BLD AUTO: 18.4 X10*3/UL (ref 4.4–11.3)
WBC #/AREA URNS AUTO: ABNORMAL /HPF
YEAST BUDDING #/AREA UR COMP ASSIST: PRESENT /HPF

## 2024-09-03 PROCEDURE — 93005 ELECTROCARDIOGRAM TRACING: CPT

## 2024-09-03 PROCEDURE — 71045 X-RAY EXAM CHEST 1 VIEW: CPT | Mod: FOREIGN READ | Performed by: RADIOLOGY

## 2024-09-03 PROCEDURE — 2500000005 HC RX 250 GENERAL PHARMACY W/O HCPCS

## 2024-09-03 PROCEDURE — 83605 ASSAY OF LACTIC ACID: CPT | Performed by: NURSE PRACTITIONER

## 2024-09-03 PROCEDURE — 84550 ASSAY OF BLOOD/URIC ACID: CPT | Performed by: NURSE PRACTITIONER

## 2024-09-03 PROCEDURE — 2500000004 HC RX 250 GENERAL PHARMACY W/ HCPCS (ALT 636 FOR OP/ED): Performed by: NURSE PRACTITIONER

## 2024-09-03 PROCEDURE — 71045 X-RAY EXAM CHEST 1 VIEW: CPT

## 2024-09-03 PROCEDURE — 73562 X-RAY EXAM OF KNEE 3: CPT | Mod: LEFT SIDE | Performed by: RADIOLOGY

## 2024-09-03 PROCEDURE — 96361 HYDRATE IV INFUSION ADD-ON: CPT

## 2024-09-03 PROCEDURE — 87075 CULTR BACTERIA EXCEPT BLOOD: CPT | Mod: 59,GEALAB | Performed by: NURSE PRACTITIONER

## 2024-09-03 PROCEDURE — 82947 ASSAY GLUCOSE BLOOD QUANT: CPT

## 2024-09-03 PROCEDURE — 87636 SARSCOV2 & INF A&B AMP PRB: CPT | Performed by: NURSE PRACTITIONER

## 2024-09-03 PROCEDURE — 99291 CRITICAL CARE FIRST HOUR: CPT | Performed by: STUDENT IN AN ORGANIZED HEALTH CARE EDUCATION/TRAINING PROGRAM

## 2024-09-03 PROCEDURE — 36415 COLL VENOUS BLD VENIPUNCTURE: CPT | Performed by: NURSE PRACTITIONER

## 2024-09-03 PROCEDURE — 80053 COMPREHEN METABOLIC PANEL: CPT | Performed by: NURSE PRACTITIONER

## 2024-09-03 PROCEDURE — 36415 COLL VENOUS BLD VENIPUNCTURE: CPT

## 2024-09-03 PROCEDURE — 85730 THROMBOPLASTIN TIME PARTIAL: CPT | Performed by: NURSE PRACTITIONER

## 2024-09-03 PROCEDURE — 1100000001 HC PRIVATE ROOM DAILY

## 2024-09-03 PROCEDURE — 96374 THER/PROPH/DIAG INJ IV PUSH: CPT

## 2024-09-03 PROCEDURE — 96365 THER/PROPH/DIAG IV INF INIT: CPT

## 2024-09-03 PROCEDURE — 2500000001 HC RX 250 WO HCPCS SELF ADMINISTERED DRUGS (ALT 637 FOR MEDICARE OP)

## 2024-09-03 PROCEDURE — 73562 X-RAY EXAM OF KNEE 3: CPT | Mod: LT

## 2024-09-03 PROCEDURE — 83735 ASSAY OF MAGNESIUM: CPT | Performed by: NURSE PRACTITIONER

## 2024-09-03 PROCEDURE — 2500000004 HC RX 250 GENERAL PHARMACY W/ HCPCS (ALT 636 FOR OP/ED): Performed by: STUDENT IN AN ORGANIZED HEALTH CARE EDUCATION/TRAINING PROGRAM

## 2024-09-03 PROCEDURE — 87086 URINE CULTURE/COLONY COUNT: CPT | Mod: GEALAB | Performed by: NURSE PRACTITIONER

## 2024-09-03 PROCEDURE — 85652 RBC SED RATE AUTOMATED: CPT | Performed by: NURSE PRACTITIONER

## 2024-09-03 PROCEDURE — 85610 PROTHROMBIN TIME: CPT | Performed by: NURSE PRACTITIONER

## 2024-09-03 PROCEDURE — 87040 BLOOD CULTURE FOR BACTERIA: CPT | Mod: GEALAB | Performed by: NURSE PRACTITIONER

## 2024-09-03 PROCEDURE — 93971 EXTREMITY STUDY: CPT

## 2024-09-03 PROCEDURE — 86140 C-REACTIVE PROTEIN: CPT | Performed by: NURSE PRACTITIONER

## 2024-09-03 PROCEDURE — 81001 URINALYSIS AUTO W/SCOPE: CPT | Performed by: NURSE PRACTITIONER

## 2024-09-03 PROCEDURE — 85025 COMPLETE CBC W/AUTO DIFF WBC: CPT | Performed by: NURSE PRACTITIONER

## 2024-09-03 PROCEDURE — 93971 EXTREMITY STUDY: CPT | Mod: FOREIGN READ | Performed by: RADIOLOGY

## 2024-09-03 PROCEDURE — 2500000004 HC RX 250 GENERAL PHARMACY W/ HCPCS (ALT 636 FOR OP/ED)

## 2024-09-03 RX ORDER — ENOXAPARIN SODIUM 100 MG/ML
40 INJECTION SUBCUTANEOUS EVERY 24 HOURS
Status: DISCONTINUED | OUTPATIENT
Start: 2024-09-03 | End: 2024-09-04 | Stop reason: HOSPADM

## 2024-09-03 RX ORDER — KETOROLAC TROMETHAMINE 15 MG/ML
15 INJECTION, SOLUTION INTRAMUSCULAR; INTRAVENOUS EVERY 6 HOURS PRN
Status: DISCONTINUED | OUTPATIENT
Start: 2024-09-03 | End: 2024-09-04 | Stop reason: HOSPADM

## 2024-09-03 RX ORDER — ACETAMINOPHEN 325 MG/1
975 TABLET ORAL EVERY 6 HOURS PRN
Status: DISCONTINUED | OUTPATIENT
Start: 2024-09-03 | End: 2024-09-04 | Stop reason: HOSPADM

## 2024-09-03 RX ORDER — CEFTRIAXONE 1 G/50ML
1 INJECTION, SOLUTION INTRAVENOUS EVERY 24 HOURS
Status: DISCONTINUED | OUTPATIENT
Start: 2024-09-04 | End: 2024-09-04 | Stop reason: HOSPADM

## 2024-09-03 RX ORDER — LOSARTAN POTASSIUM 50 MG/1
100 TABLET ORAL DAILY
Status: DISCONTINUED | OUTPATIENT
Start: 2024-09-04 | End: 2024-09-04 | Stop reason: HOSPADM

## 2024-09-03 RX ORDER — CEFTRIAXONE 2 G/50ML
2 INJECTION, SOLUTION INTRAVENOUS ONCE
Status: COMPLETED | OUTPATIENT
Start: 2024-09-03 | End: 2024-09-03

## 2024-09-03 RX ORDER — HYDROCHLOROTHIAZIDE 25 MG/1
12.5 TABLET ORAL DAILY
Status: DISCONTINUED | OUTPATIENT
Start: 2024-09-04 | End: 2024-09-04 | Stop reason: HOSPADM

## 2024-09-03 RX ORDER — KETOROLAC TROMETHAMINE 15 MG/ML
15 INJECTION, SOLUTION INTRAMUSCULAR; INTRAVENOUS ONCE
Status: COMPLETED | OUTPATIENT
Start: 2024-09-03 | End: 2024-09-03

## 2024-09-03 RX ORDER — ACETAMINOPHEN 325 MG/1
975 TABLET ORAL ONCE
Status: COMPLETED | OUTPATIENT
Start: 2024-09-03 | End: 2024-09-03

## 2024-09-03 RX ORDER — LIDOCAINE 560 MG/1
1 PATCH PERCUTANEOUS; TOPICAL; TRANSDERMAL DAILY
Status: DISCONTINUED | OUTPATIENT
Start: 2024-09-04 | End: 2024-09-03

## 2024-09-03 RX ORDER — OXYCODONE HYDROCHLORIDE 5 MG/1
2.5 TABLET ORAL EVERY 6 HOURS PRN
Status: DISCONTINUED | OUTPATIENT
Start: 2024-09-03 | End: 2024-09-04 | Stop reason: HOSPADM

## 2024-09-03 RX ORDER — LIDOCAINE 560 MG/1
1 PATCH PERCUTANEOUS; TOPICAL; TRANSDERMAL DAILY
Status: DISCONTINUED | OUTPATIENT
Start: 2024-09-03 | End: 2024-09-04 | Stop reason: HOSPADM

## 2024-09-03 RX ADMIN — SODIUM CHLORIDE 2000 ML: 9 INJECTION, SOLUTION INTRAVENOUS at 16:54

## 2024-09-03 RX ADMIN — KETOROLAC TROMETHAMINE 15 MG: 15 INJECTION, SOLUTION INTRAMUSCULAR; INTRAVENOUS at 16:47

## 2024-09-03 RX ADMIN — CEFTRIAXONE SODIUM 2 G: 2 INJECTION, SOLUTION INTRAVENOUS at 19:14

## 2024-09-03 RX ADMIN — ENOXAPARIN SODIUM 40 MG: 40 INJECTION SUBCUTANEOUS at 21:45

## 2024-09-03 RX ADMIN — ACETAMINOPHEN 975 MG: 325 TABLET ORAL at 16:47

## 2024-09-03 RX ADMIN — LIDOCAINE 4% 1 PATCH: 40 PATCH TOPICAL at 21:45

## 2024-09-03 RX ADMIN — KETOROLAC TROMETHAMINE 15 MG: 15 INJECTION, SOLUTION INTRAMUSCULAR; INTRAVENOUS at 21:20

## 2024-09-03 SDOH — SOCIAL STABILITY: SOCIAL INSECURITY: ARE YOU OR HAVE YOU BEEN THREATENED OR ABUSED PHYSICALLY, EMOTIONALLY, OR SEXUALLY BY ANYONE?: NO

## 2024-09-03 SDOH — SOCIAL STABILITY: SOCIAL INSECURITY: ARE THERE ANY APPARENT SIGNS OF INJURIES/BEHAVIORS THAT COULD BE RELATED TO ABUSE/NEGLECT?: NO

## 2024-09-03 SDOH — SOCIAL STABILITY: SOCIAL INSECURITY: DO YOU FEEL UNSAFE GOING BACK TO THE PLACE WHERE YOU ARE LIVING?: NO

## 2024-09-03 SDOH — SOCIAL STABILITY: SOCIAL INSECURITY: DO YOU FEEL ANYONE HAS EXPLOITED OR TAKEN ADVANTAGE OF YOU FINANCIALLY OR OF YOUR PERSONAL PROPERTY?: NO

## 2024-09-03 SDOH — SOCIAL STABILITY: SOCIAL INSECURITY: HAS ANYONE EVER THREATENED TO HURT YOUR FAMILY OR YOUR PETS?: NO

## 2024-09-03 SDOH — SOCIAL STABILITY: SOCIAL INSECURITY: ABUSE: ADULT

## 2024-09-03 SDOH — SOCIAL STABILITY: SOCIAL INSECURITY: DOES ANYONE TRY TO KEEP YOU FROM HAVING/CONTACTING OTHER FRIENDS OR DOING THINGS OUTSIDE YOUR HOME?: NO

## 2024-09-03 SDOH — SOCIAL STABILITY: SOCIAL INSECURITY: HAVE YOU HAD THOUGHTS OF HARMING ANYONE ELSE?: NO

## 2024-09-03 SDOH — SOCIAL STABILITY: SOCIAL INSECURITY: HAVE YOU HAD ANY THOUGHTS OF HARMING ANYONE ELSE?: NO

## 2024-09-03 ASSESSMENT — PAIN SCALES - GENERAL
PAINLEVEL_OUTOF10: 7
PAINLEVEL_OUTOF10: 6
PAINLEVEL_OUTOF10: 2
PAINLEVEL_OUTOF10: 2
PAINLEVEL_OUTOF10: 8

## 2024-09-03 ASSESSMENT — COGNITIVE AND FUNCTIONAL STATUS - GENERAL
WALKING IN HOSPITAL ROOM: A LITTLE
MOVING TO AND FROM BED TO CHAIR: A LITTLE
DAILY ACTIVITIY SCORE: 20
HELP NEEDED FOR BATHING: A LITTLE
DRESSING REGULAR UPPER BODY CLOTHING: A LITTLE
STANDING UP FROM CHAIR USING ARMS: A LITTLE
TOILETING: A LITTLE
DRESSING REGULAR LOWER BODY CLOTHING: A LITTLE
CLIMB 3 TO 5 STEPS WITH RAILING: A LITTLE
TURNING FROM BACK TO SIDE WHILE IN FLAT BAD: A LITTLE
PATIENT BASELINE BEDBOUND: NO
MOBILITY SCORE: 18
MOVING FROM LYING ON BACK TO SITTING ON SIDE OF FLAT BED WITH BEDRAILS: A LITTLE

## 2024-09-03 ASSESSMENT — LIFESTYLE VARIABLES
AUDIT-C TOTAL SCORE: 0
HOW MANY STANDARD DRINKS CONTAINING ALCOHOL DO YOU HAVE ON A TYPICAL DAY: PATIENT DOES NOT DRINK
HOW OFTEN DO YOU HAVE A DRINK CONTAINING ALCOHOL: NEVER
HOW OFTEN DO YOU HAVE 6 OR MORE DRINKS ON ONE OCCASION: NEVER
AUDIT-C TOTAL SCORE: 0
SKIP TO QUESTIONS 9-10: 1

## 2024-09-03 ASSESSMENT — ACTIVITIES OF DAILY LIVING (ADL)
TOILETING: INDEPENDENT
BATHING: INDEPENDENT
PATIENT'S MEMORY ADEQUATE TO SAFELY COMPLETE DAILY ACTIVITIES?: YES
ADEQUATE_TO_COMPLETE_ADL: YES
HEARING - RIGHT EAR: FUNCTIONAL
HEARING - LEFT EAR: FUNCTIONAL
LACK_OF_TRANSPORTATION: NO
GROOMING: INDEPENDENT
DRESSING YOURSELF: INDEPENDENT
FEEDING YOURSELF: INDEPENDENT
JUDGMENT_ADEQUATE_SAFELY_COMPLETE_DAILY_ACTIVITIES: YES
WALKS IN HOME: INDEPENDENT

## 2024-09-03 ASSESSMENT — PATIENT HEALTH QUESTIONNAIRE - PHQ9
2. FEELING DOWN, DEPRESSED OR HOPELESS: NOT AT ALL
SUM OF ALL RESPONSES TO PHQ9 QUESTIONS 1 & 2: 0
1. LITTLE INTEREST OR PLEASURE IN DOING THINGS: NOT AT ALL

## 2024-09-03 ASSESSMENT — ENCOUNTER SYMPTOMS
CONSTIPATION: 0
JOINT SWELLING: 0
NAUSEA: 0
VOMITING: 0
DIARRHEA: 0
FEVER: 1
ARTHRALGIAS: 1
WHEEZING: 0
FLANK PAIN: 0
SHORTNESS OF BREATH: 0
PALPITATIONS: 0

## 2024-09-03 ASSESSMENT — PAIN - FUNCTIONAL ASSESSMENT
PAIN_FUNCTIONAL_ASSESSMENT: 0-10

## 2024-09-03 ASSESSMENT — COLUMBIA-SUICIDE SEVERITY RATING SCALE - C-SSRS
2. HAVE YOU ACTUALLY HAD ANY THOUGHTS OF KILLING YOURSELF?: NO
6. HAVE YOU EVER DONE ANYTHING, STARTED TO DO ANYTHING, OR PREPARED TO DO ANYTHING TO END YOUR LIFE?: NO
1. IN THE PAST MONTH, HAVE YOU WISHED YOU WERE DEAD OR WISHED YOU COULD GO TO SLEEP AND NOT WAKE UP?: NO

## 2024-09-03 ASSESSMENT — PAIN DESCRIPTION - LOCATION: LOCATION: KNEE

## 2024-09-03 ASSESSMENT — PAIN DESCRIPTION - ORIENTATION: ORIENTATION: LEFT

## 2024-09-03 NOTE — TELEPHONE ENCOUNTER
Pt was transported to hospital per call from Anatoly, pt's spouse.  Fever was not reducing.     Noted

## 2024-09-03 NOTE — TELEPHONE ENCOUNTER
Wife states patient completed the prednisone Rx for his L knee pain.  Pain resolved while being on the medication but has not returned.  He would like to know what the next step is.

## 2024-09-03 NOTE — ED TRIAGE NOTES
[facility] from home with his wife for left knee pain, fever, and confusion. Pt states his left knee has been hurting him for aprox 2 weeks, states his PCP placed him on prednisone but it has not helped. States today he did not feel well and left work early. States he has Hx of sepsis for a UTI and felt that it was happenings again. Pt has RLQ urostomy for Hx of bladder CA.

## 2024-09-03 NOTE — ED PROVIDER NOTES
[facility]  ED  Encounter Note  Admit Date/RoomTime: 9/3/2024  3:45 PM  ED Room: [place] [address]  NAME: Madan Marquez  : 1955  MRN: [id number]     Chief Complaint:  Knee Pain and Fever    HISTORY OF PRESENT ILLNESS        Madan Marquez is a 68 y.o. male who presents to the ED for evaluation of fever chills and left knee pain.    Patient stated that he once had sepsis due to uti and feels similarly. He has had fever and increased pain and swelling to his left knee all day. He was given steroids for left knee pain and just finished them. Has had a cough mild but denied any other issues. No one with covid. No abdominal pain, diarrhea. Felt slightly weak today prompted him to come to the ED. No blood thinners.     ROS   Pertinent positives and negatives are stated within HPI, all other systems reviewed and are negative.    Past Medical History:  has a past medical history of Abdominal distension (gaseous) (2019), Acute lower UTI (2023), Acute maxillary sinusitis (2023), Acute postoperative pain (2024), Bacteremia (10/12/2022), Body mass index (BMI) 35.0-35.9, adult (2021), Contact with and (suspected) exposure to covid-19 (10/27/2022), Diverticulitis (2024), Dysfunction of eustachian tube (2024), Elevated blood-pressure reading, without diagnosis of hypertension (2016), Hematuria (2023), Hypoxia (2024), Ingrowing nail (2017), Injury of kidney (10/27/2022), Leukocytosis (2023), Localized edema (03/15/2019), Morbid (severe) obesity due to excess calories (Multi) (2021), Other chest pain (2019), Other transient cerebral ischemic attacks and related syndromes, Personal history of diseases of the blood and blood-forming organs and certain disorders involving the immune mechanism (2019), Personal history of other diseases of the nervous system and sense organs (2020), Personal history  of other diseases of the respiratory system (11/06/2017), Personal history of other diseases of the respiratory system (06/25/2019), Personal history of other diseases of the respiratory system (02/08/2016), Personal history of other endocrine, nutritional and metabolic disease (10/06/2021), Personal history of other specified conditions (01/22/2019), Personal history of other specified conditions (08/15/2018), Personal history of transient ischemic attack (TIA), and cerebral infarction without residual deficits (05/05/2017), Septic shock (Multi) (01/17/2024), and Unspecified symptoms and signs involving the genitourinary system (05/09/2019).    Surgical History:  has a past surgical history that includes Other surgical history (11/06/2017); Tonsillectomy (11/06/2017); and Other surgical history (09/19/2022).    Social History:  reports that he has quit smoking. His smoking use included cigarettes. He has never used smokeless tobacco. He reports that he does not currently use alcohol. He reports that he does not use drugs.    Family History: family history includes GI problems in his mother; Heart attack in his father; Heart disease in his father; Heart failure in his mother.     Allergies: Codeine and Midazolam    PHYSICAL EXAM   Oxygen Saturation Interpretation: Normal.        Physical Exam  Constitutional/General: Alert and oriented x3, well appearing, non toxic  HEENT:  NC/NT. PERRLA.  Airway patent.  Neck: Supple, full ROM. No midline vertebral tenderness or crepitus.   Respiratory: Lung sounds clear to auscultation bilaterally. No wheezes, rhonchi or stridor. Not in respiratory distress.  CV:  Regular rate. Regular rhythm. No murmurs or rubs. 2+ distal pulses.  GI:  Abdomen soft, non-tender, non-distended. +BS. No rebound, guarding, or rigidity. No pulsatile masses.  Urostomy with cloudy urine no tenderness  Musculoskeletal: Moves all extremities x 4. Warm and well perfused. Capillary refill <3  seconds  Integument: Skin warm and dry. No rashes.   Neurologic: Alert and oriented with no focal deficits, symmetric strength 5/5 in the upper and lower extremities bilaterally.  Psychiatric: Normal affect.//left knee painful to touch no redness swelling noted pulses +2 good range of motion no thigh pain    Lab / Imaging Results   (All laboratory and radiology results have been personally reviewed by myself)  Labs:  Results for orders placed or performed during the hospital encounter of 09/03/24   CBC and Auto Differential   Result Value Ref Range    WBC 18.4 (H) 4.4 - 11.3 x10*3/uL    nRBC 0.0 0.0 - 0.0 /100 WBCs    RBC 5.08 4.50 - 5.90 x10*6/uL    Hemoglobin 15.1 13.5 - 17.5 g/dL    Hematocrit 46.3 41.0 - 52.0 %    MCV 91 80 - 100 fL    MCH 29.7 26.0 - 34.0 pg    MCHC 32.6 32.0 - 36.0 g/dL    RDW 13.3 11.5 - 14.5 %    Platelets 279 150 - 450 x10*3/uL    Neutrophils % 83.1 40.0 - 80.0 %    Immature Granulocytes %, Automated 0.9 0.0 - 0.9 %    Lymphocytes % 8.2 13.0 - 44.0 %    Monocytes % 6.4 2.0 - 10.0 %    Eosinophils % 0.9 0.0 - 6.0 %    Basophils % 0.5 0.0 - 2.0 %    Neutrophils Absolute 15.25 (H) 1.20 - 7.70 x10*3/uL    Immature Granulocytes Absolute, Automated 0.16 0.00 - 0.70 x10*3/uL    Lymphocytes Absolute 1.50 1.20 - 4.80 x10*3/uL    Monocytes Absolute 1.17 (H) 0.10 - 1.00 x10*3/uL    Eosinophils Absolute 0.17 0.00 - 0.70 x10*3/uL    Basophils Absolute 0.10 0.00 - 0.10 x10*3/uL   Magnesium   Result Value Ref Range    Magnesium 1.80 1.60 - 2.40 mg/dL   Comprehensive metabolic panel   Result Value Ref Range    Glucose 162 (H) 74 - 99 mg/dL    Sodium 136 136 - 145 mmol/L    Potassium 3.9 3.5 - 5.3 mmol/L    Chloride 101 98 - 107 mmol/L    Bicarbonate 26 21 - 32 mmol/L    Anion Gap 13 10 - 20 mmol/L    Urea Nitrogen 33 (H) 6 - 23 mg/dL    Creatinine 1.29 0.50 - 1.30 mg/dL    eGFR 60 (L) >60 mL/min/1.73m*2    Calcium 9.9 8.6 - 10.3 mg/dL    Albumin 4.2 3.4 - 5.0 g/dL    Alkaline Phosphatase 58 33 - 136 U/L     Total Protein 7.8 6.4 - 8.2 g/dL    AST 15 9 - 39 U/L    Bilirubin, Total 0.8 0.0 - 1.2 mg/dL    ALT 23 10 - 52 U/L   Lactate   Result Value Ref Range    Lactate 2.4 (H) 0.4 - 2.0 mmol/L   Protime-INR   Result Value Ref Range    Protime 11.7 9.8 - 12.8 seconds    INR 1.0 0.9 - 1.1   aPTT   Result Value Ref Range    aPTT 28 27 - 38 seconds   C-Reactive Protein   Result Value Ref Range    C-Reactive Protein 1.04 (H) <1.00 mg/dL   Sedimentation Rate   Result Value Ref Range    Sedimentation Rate 22 (H) 0 - 20 mm/h   Influenza A, and B PCR   Result Value Ref Range    Flu A Result Not Detected Not Detected    Flu B Result Not Detected Not Detected   Sars-CoV-2 PCR   Result Value Ref Range    Coronavirus 2019, PCR Not Detected Not Detected   Uric acid   Result Value Ref Range    Uric Acid 8.7 (H) 4.0 - 7.5 mg/dL   Urinalysis with Reflex Culture and Microscopic   Result Value Ref Range    Color, Urine Light-Orange (N) Light-Yellow, Yellow, Dark-Yellow    Appearance, Urine Turbid (N) Clear    Specific Gravity, Urine 1.018 1.005 - 1.035    pH, Urine 7.5 5.0, 5.5, 6.0, 6.5, 7.0, 7.5, 8.0    Protein, Urine 30 (1+) (A) NEGATIVE, 10 (TRACE), 20 (TRACE) mg/dL    Glucose, Urine Normal Normal mg/dL    Blood, Urine NEGATIVE NEGATIVE    Ketones, Urine NEGATIVE NEGATIVE mg/dL    Bilirubin, Urine NEGATIVE NEGATIVE    Urobilinogen, Urine Normal Normal mg/dL    Nitrite, Urine 1+ (A) NEGATIVE    Leukocyte Esterase, Urine NEGATIVE NEGATIVE   Microscopic Only, Urine   Result Value Ref Range    WBC, Urine 11-20 (A) 1-5, NONE /HPF    RBC, Urine 6-10 (A) NONE, 1-2, 3-5 /HPF    Bacteria, Urine 1+ (A) NONE SEEN /HPF    Budding Yeast, Urine PRESENT (A) NONE /HPF    Mucus, Urine FEW Reference range not established. /LPF    Triple Phosphate Crystals, Urine 1+ NONE, 1+ /HPF    Uric Acid Crystals, Urine 1+ NONE, 1+ /HPF   Lactate   Result Value Ref Range    Lactate 1.9 0.4 - 2.0 mmol/L     Imaging:  All Radiology results interpreted by Radiologist  unless otherwise noted.  Lower extremity venous duplex left   Final Result   No evidence for DVT within the left lower extremity.   Signed by Chris Gilbert,       XR knee left 3 views   Final Result   No acute bony abnormalities.  No definite change is similar compared   to the prior exam..   Signed by Kristian Wood MD      XR chest 1 view   Final Result   No acute cardiopulmonary disease.   Signed by Kristian Wood MD          ED Course / Medical Decision Making     Medications   acetaminophen (Tylenol) tablet 975 mg (975 mg oral Given 9/3/24 1647)   ketorolac (Toradol) injection 15 mg (15 mg intravenous Given 9/3/24 1647)   sodium chloride 0.9 % bolus 2,000 mL (0 mL intravenous Stopped 9/3/24 1812)   cefTRIAXone (Rocephin) 2 g in dextrose (iso) IV 50 mL (2 g intravenous New Bag 9/3/24 1914)     ED Course as of 09/03/24 2011   Tue Sep 03, 2024   1642 No acute bony abnormalities.  No definite change is similar compared to the prior exam..   [HD]   1642 Patient is a 68-year-old male with a history of bladder cancer status post cystectomy with urostomy bag presents to the emergency department with concern for sepsis.  He is tearful as he was septic about a year ago from his urostomy and thinks it might be coming back.  A couple weeks ago he had some left-sided knee pain.  He was feeling better after being placed on a prednisone taper which she finished a few days ago.  Today all of a sudden he started having fevers chills rigors and severe pain in his left knee.  On exam the knee is not red hot or swollen.  He does have pain with light touch but there is no significant joint effusion.  He is able to range it but it is uncomfortable with range of motion.  However on exam it does not appear consistent with a septic joint.  Although did discuss with patient if we do not have a source for his infection today will require an arthrocentesis.  Patient amendable.  Patient treated with antipyretics and IV fluids pending  workup as this may be viral.  If viral swabs are negative will cover with broad-spectrum antibiotics and admit patient.  Discussed with patient and family at bedside who are agreeable. [HD]   1645 EKG read by me reviewed by me is sinus tachycardia at 105.  Right bundle branch block appreciated.  Left axis deviation.  No significant ST segment elevation or depression but does have T wave inversions in anterior septal leads. T wave inversion are new. [HD]   1714 WBC(!): 18.4 [HD]      ED Course User Index  [HD] Sofie Kelly DO         Diagnoses as of 09/03/24 2011   Acute pain of left knee   Fever and chills   Cystitis   Sepsis, due to unspecified organism, unspecified whether acute organ dysfunction present (Multi)     Re-examination:    Patient’s condition stable    MDM:     Madan Marquez is a 68 y.o. male who presents to the ED for evaluation of fever chills and left knee pain.    Patient stated that he once had sepsis due to uti and feels similarly. He has had fever and increased pain and swelling to his left knee all day. He was given steroids for left knee pain and just finished them. Has had a cough mild but denied any other issues. No one with covid. No abdominal pain, diarrhea. Felt slightly weak today prompted him to come to the ED. No blood thinners.     ED course  Stable   Uric acid 8.7  Sed rate and c reactive slightly elevated  C reactive 1.04 and sed rate 22  Mag 1.80  Glucose 162   Wbc 18.2 with neutrophils 15.25  Blood cultures and urine culture cultures  Covid flu a b negative  Received 2liters of iv fluids   Received toradol 30 mg IV tylenol 975 mg po x one and Rocephin 2 grams.  No need for I&D of left knee as not hot red swollen.   Us of leg neg for dvt  Xray of knee negative for fracture  Cxr negative for infection  Admitted to hospitalist   Ddx: sepsis acute pain in left knee cystitis fever and chills     Plan of Care/Counseling:  I reviewed today's visit with the patient  in addition  to providing specific details for the plan of care and counseling regarding the diagnosis and prognosis.  Questions are answered at this time and are agreeable with the plan.    ASSESSMENT     1. Sepsis, due to unspecified organism, unspecified whether acute organ dysfunction present (Multi)    2. Acute pain of left knee    3. Fever and chills    4. Cystitis      PLAN   Admit to hospitalist     New Medications     New Medications Ordered This Visit   Medications    acetaminophen (Tylenol) tablet 975 mg     Order Specific Question:   If ordered PRN for pain, nurse is permitted to administer this medication for higher pain scores based on patient preference?     Answer:   Yes    ketorolac (Toradol) injection 15 mg    sodium chloride 0.9 % bolus 2,000 mL    cefTRIAXone (Rocephin) 2 g in dextrose (iso) IV 50 mL     Order Specific Question:   Suspected Indication (Select all that apply)     Answer:   Urinary Tract Infection     Order Specific Question:   Type of Therapy     Answer:   Empiric     Order Specific Question:   Type of Urinary Tract Infection     Answer:   Uncomplicated     Electronically signed by KEMAL Farrar     **This report was transcribed using voice recognition software. Every effort was made to ensure accuracy; however, inadvertent computerized transcription errors may be present.  END OF ED PROVIDER NOTE     KEMAL Farrar  09/03/24 2011

## 2024-09-04 ENCOUNTER — PHARMACY VISIT (OUTPATIENT)
Dept: PHARMACY | Facility: CLINIC | Age: 69
End: 2024-09-04
Payer: COMMERCIAL

## 2024-09-04 VITALS
OXYGEN SATURATION: 92 % | HEIGHT: 70 IN | RESPIRATION RATE: 17 BRPM | HEART RATE: 70 BPM | SYSTOLIC BLOOD PRESSURE: 116 MMHG | BODY MASS INDEX: 34.36 KG/M2 | WEIGHT: 240 LBS | DIASTOLIC BLOOD PRESSURE: 66 MMHG | TEMPERATURE: 99.9 F

## 2024-09-04 LAB
ALBUMIN SERPL BCP-MCNC: 3.3 G/DL (ref 3.4–5)
ANION GAP SERPL CALC-SCNC: 14 MMOL/L (ref 10–20)
BASOPHILS # BLD AUTO: 0.05 X10*3/UL (ref 0–0.1)
BASOPHILS NFR BLD AUTO: 0.4 %
BUN SERPL-MCNC: 31 MG/DL (ref 6–23)
CALCIUM SERPL-MCNC: 8.6 MG/DL (ref 8.6–10.3)
CHLORIDE SERPL-SCNC: 106 MMOL/L (ref 98–107)
CO2 SERPL-SCNC: 21 MMOL/L (ref 21–32)
CREAT SERPL-MCNC: 1.4 MG/DL (ref 0.5–1.3)
EGFRCR SERPLBLD CKD-EPI 2021: 54 ML/MIN/1.73M*2
EOSINOPHIL # BLD AUTO: 0.23 X10*3/UL (ref 0–0.7)
EOSINOPHIL NFR BLD AUTO: 1.7 %
ERYTHROCYTE [DISTWIDTH] IN BLOOD BY AUTOMATED COUNT: 13.5 % (ref 11.5–14.5)
GLUCOSE BLD MANUAL STRIP-MCNC: 106 MG/DL (ref 74–99)
GLUCOSE BLD MANUAL STRIP-MCNC: 149 MG/DL (ref 74–99)
GLUCOSE SERPL-MCNC: 132 MG/DL (ref 74–99)
HCT VFR BLD AUTO: 40.6 % (ref 41–52)
HGB BLD-MCNC: 13 G/DL (ref 13.5–17.5)
HOLD SPECIMEN: NORMAL
IMM GRANULOCYTES # BLD AUTO: 0.11 X10*3/UL (ref 0–0.7)
IMM GRANULOCYTES NFR BLD AUTO: 0.8 % (ref 0–0.9)
LYMPHOCYTES # BLD AUTO: 1.57 X10*3/UL (ref 1.2–4.8)
LYMPHOCYTES NFR BLD AUTO: 11.6 %
MAGNESIUM SERPL-MCNC: 1.69 MG/DL (ref 1.6–2.4)
MCH RBC QN AUTO: 29.5 PG (ref 26–34)
MCHC RBC AUTO-ENTMCNC: 32 G/DL (ref 32–36)
MCV RBC AUTO: 92 FL (ref 80–100)
MONOCYTES # BLD AUTO: 1.31 X10*3/UL (ref 0.1–1)
MONOCYTES NFR BLD AUTO: 9.7 %
NEUTROPHILS # BLD AUTO: 10.25 X10*3/UL (ref 1.2–7.7)
NEUTROPHILS NFR BLD AUTO: 75.8 %
NRBC BLD-RTO: 0 /100 WBCS (ref 0–0)
PHOSPHATE SERPL-MCNC: 3.4 MG/DL (ref 2.5–4.9)
PLATELET # BLD AUTO: 222 X10*3/UL (ref 150–450)
POTASSIUM SERPL-SCNC: 4.3 MMOL/L (ref 3.5–5.3)
RBC # BLD AUTO: 4.4 X10*6/UL (ref 4.5–5.9)
SODIUM SERPL-SCNC: 137 MMOL/L (ref 136–145)
WBC # BLD AUTO: 13.5 X10*3/UL (ref 4.4–11.3)

## 2024-09-04 PROCEDURE — 2500000004 HC RX 250 GENERAL PHARMACY W/ HCPCS (ALT 636 FOR OP/ED)

## 2024-09-04 PROCEDURE — 83735 ASSAY OF MAGNESIUM: CPT

## 2024-09-04 PROCEDURE — 99236 HOSP IP/OBS SAME DATE HI 85: CPT

## 2024-09-04 PROCEDURE — 85025 COMPLETE CBC W/AUTO DIFF WBC: CPT

## 2024-09-04 PROCEDURE — 84100 ASSAY OF PHOSPHORUS: CPT

## 2024-09-04 PROCEDURE — 36415 COLL VENOUS BLD VENIPUNCTURE: CPT

## 2024-09-04 PROCEDURE — RXMED WILLOW AMBULATORY MEDICATION CHARGE

## 2024-09-04 PROCEDURE — 82947 ASSAY GLUCOSE BLOOD QUANT: CPT

## 2024-09-04 RX ORDER — CIPROFLOXACIN 500 MG/1
500 TABLET ORAL 2 TIMES DAILY
Qty: 14 TABLET | Refills: 0 | Status: SHIPPED | OUTPATIENT
Start: 2024-09-04 | End: 2024-09-11

## 2024-09-04 RX ORDER — INSULIN LISPRO 100 [IU]/ML
0-10 INJECTION, SOLUTION INTRAVENOUS; SUBCUTANEOUS
Status: DISCONTINUED | OUTPATIENT
Start: 2024-09-04 | End: 2024-09-04 | Stop reason: HOSPADM

## 2024-09-04 RX ORDER — MAGNESIUM SULFATE HEPTAHYDRATE 40 MG/ML
2 INJECTION, SOLUTION INTRAVENOUS ONCE
Status: COMPLETED | OUTPATIENT
Start: 2024-09-04 | End: 2024-09-04

## 2024-09-04 RX ADMIN — MAGNESIUM SULFATE HEPTAHYDRATE 2 G: 40 INJECTION, SOLUTION INTRAVENOUS at 09:13

## 2024-09-04 RX ADMIN — ACETAMINOPHEN 975 MG: 325 TABLET ORAL at 04:08

## 2024-09-04 ASSESSMENT — COGNITIVE AND FUNCTIONAL STATUS - GENERAL
DRESSING REGULAR LOWER BODY CLOTHING: A LITTLE
MOBILITY SCORE: 22
WALKING IN HOSPITAL ROOM: A LITTLE
DAILY ACTIVITIY SCORE: 23
CLIMB 3 TO 5 STEPS WITH RAILING: A LITTLE

## 2024-09-04 ASSESSMENT — PAIN - FUNCTIONAL ASSESSMENT: PAIN_FUNCTIONAL_ASSESSMENT: 0-10

## 2024-09-04 ASSESSMENT — ACTIVITIES OF DAILY LIVING (ADL): LACK_OF_TRANSPORTATION: NO

## 2024-09-04 ASSESSMENT — PAIN SCALES - GENERAL: PAINLEVEL_OUTOF10: 3

## 2024-09-04 NOTE — PROGRESS NOTES
09/04/24 0836   Discharge Planning   Living Arrangements Spouse/significant other   Support Systems Spouse/significant other   Assistance Needed A&OX3; very independent with ADLs with no DME; pt independent with anterior urinary conduit; drives; room air baseline and currently room air   Type of Residence Private residence   Number of Stairs to Enter Residence 4   Number of Stairs Within Residence 0   Do you have animals or pets at home? No   Who is requesting discharge planning? Provider   Home or Post Acute Services None   Expected Discharge Disposition Home  (Pt very independent and not home bound. Patient denies any home going needs at this time)   Does the patient need discharge transport arranged? No   Financial Resource Strain   How hard is it for you to pay for the very basics like food, housing, medical care, and heating? Not hard   Housing Stability   In the last 12 months, was there a time when you were not able to pay the mortgage or rent on time? N   In the past 12 months, how many times have you moved where you were living? 1   At any time in the past 12 months, were you homeless or living in a shelter (including now)? N   Transportation Needs   In the past 12 months, has lack of transportation kept you from medical appointments or from getting medications? no   In the past 12 months, has lack of transportation kept you from meetings, work, or from getting things needed for daily living? No        09/04/24 1100   Discharge Planning   Expected Discharge Disposition Home  (DC confirmed for today and patient and family ok with dc. IMM already obtained. Pt denies home going needs. Family bedside to transport home once dc completed.)

## 2024-09-04 NOTE — DISCHARGE SUMMARY
Discharge Diagnosis  Sepsis, due to unspecified organism, unspecified whether acute organ dysfunction present (Multi)  UTI   Left knee pain     Issues Requiring Follow-Up  Follow up with PCP   Follow up with Orthopedic Surgery   Call patient if urine culture shows resistance to Cipro    Discharge Meds     Medication List      START taking these medications     ciprofloxacin 500 mg tablet; Commonly known as: Cipro; Take 1 tablet   (500 mg) by mouth 2 times a day for 7 days.     CONTINUE taking these medications     Blood Glucose Test strip; Generic drug: blood sugar diagnostic; Test   once daily   cephalexin 250 mg capsule; Commonly known as: Keflex; TAKE 1 CAPSULE BY   MOUTH EVERY DAY   hydroCHLOROthiazide 12.5 mg tablet; Commonly known as: Microzide; Take 1   tablet (12.5 mg) by mouth once daily.   lancets misc; 1 daily   losartan 100 mg tablet; Commonly known as: Cozaar; Take 1 tablet (100   mg) by mouth once daily.   MULTIVITAMIN 50 PLUS ORAL       Test Results Pending At Discharge  Pending Labs       Order Current Status    Urine Culture In process    Blood Culture Preliminary result    Blood Culture Preliminary result            Hospital Course  Brief HPI  Kevin Marquez is a 68 y.o. male with a PMH of COPD, urothelial carcinoma s/p radical cystoprostatectomy, BPH, T2DM, HLD, HTN who presented to [facility] with c/o fever and left knee pain. He has been having left knee pain for a few weeks and received a prednisone taper which improved the pain. 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 He stated that he didn't feel like himself when driving home from work and also had a fever of 102 F per wife.     ED Course  In the ED, patient had a leukocytosis of 18.4. UA showed 1+ nitrites, 1+ bacteria, and 11-20 WBCs. Left knee x-ray showed no acute abnormalities. Chest x-ray was unremarkable. Let lower extremity DVT US was negative. HE was given Tylenol, Ceftriaxone 2g, Toradol, and 2L NS.  Admitted to the floor for further management.       Floor Course   On arrival to the floor, he was continued on Ceftriaxone. His knee pain improved with Lidocaine patch. He was given additional IVF for a slight increase in Cr and suspected poor oral intake. He was medically stable and discharged home on 9/4/24. Given script for Ciprofloxacin 500mg BID x7 days. Instructed to follow up with PCP and Orthopedic Surgery. Will call patient post-discharge if urine culture positive.        Pertinent Physical Exam At Time of Discharge  Physical Exam  Constitutional:       General: He is not in acute distress.     Appearance: Normal appearance.   Cardiovascular:      Rate and Rhythm: Normal rate and regular rhythm.      Heart sounds: Normal heart sounds. No murmur heard.     No friction rub. No gallop.   Pulmonary:      Effort: Pulmonary effort is normal. No respiratory distress.      Breath sounds: Normal breath sounds. No wheezing, rhonchi or rales.   Abdominal:      General: Abdomen is flat. Bowel sounds are normal. There is no distension.      Palpations: Abdomen is soft.      Tenderness: There is no abdominal tenderness.   Musculoskeletal:         General: Tenderness present. No swelling. Normal range of motion.      Comments: Left knee with point tenderness    Skin:     General: Skin is warm and dry.      Findings: No erythema or rash.   Neurological:      General: No focal deficit present.      Mental Status: He is alert and oriented to person, place, and time. Mental status is at baseline.   Psychiatric:         Mood and Affect: Mood normal.         Behavior: Behavior normal.         Outpatient Follow-Up  Future Appointments   Date Time Provider Department Center   9/26/2024 10:30 AM Beatrice Francis MD [id number] [facility]   10/18/2024  8:00 AM Chelsey Valencia MD [id number] [facility]   1/10/2025  8:20 AM Carlos Manuel Ward MD [id number] [facility]   4/18/2025  8:30 AM FLORINDA [id number] P[facility] ROOM [address]   6/2/2025  3:00 PM  Franco Tomlin MD [id number] [facility]         Cecelia Alvarado, DO

## 2024-09-04 NOTE — ED PROCEDURE NOTE
Procedure  Critical Care    Performed by: Sofie Kelly DO  Authorized by: Sofie Kelly DO    Critical care provider statement:     Critical care time (minutes):  40    Critical care time was exclusive of:  Separately billable procedures and treating other patients    Critical care was necessary to treat or prevent imminent or life-threatening deterioration of the following conditions:  Sepsis    Critical care was time spent personally by me on the following activities:  Blood draw for specimens, development of treatment plan with patient or surrogate, discussions with primary provider, evaluation of patient's response to treatment, examination of patient, ordering and performing treatments and interventions, ordering and review of laboratory studies, pulse oximetry, re-evaluation of patient's condition and ordering and review of radiographic studies               Sofie Kelly DO  09/03/24 1822

## 2024-09-04 NOTE — PROGRESS NOTES
"Pharmacy Medication History Review    Madan Marquez \"Kevin\" is a 68 y.o. male admitted for Sepsis, due to unspecified organism, unspecified whether acute organ dysfunction present (Multi). Pharmacy reviewed the patient's [id number] medications and allergies for accuracy.    The list below reflectives the updated PTA list. Please review each medication in order reconciliation for additional clarification and justification.  Medications Prior to Admission   Medication Sig Dispense Refill Last Dose    blood sugar diagnostic (Blood Glucose Test) strip Test once daily 100 strip 3 9/3/2024    cephalexin (Keflex) 250 mg capsule TAKE 1 CAPSULE BY MOUTH EVERY  capsule 1 9/3/2024    hydroCHLOROthiazide (Microzide) 12.5 mg tablet Take 1 tablet (12.5 mg) by mouth once daily. 90 tablet 1 9/3/2024    lancets misc 1 daily 100 each 3 9/3/2024    losartan (Cozaar) 100 mg tablet Take 1 tablet (100 mg) by mouth once daily. 90 tablet 1 9/3/2024    multivit with minerals/lutein (MULTIVITAMIN 50 PLUS ORAL) Take 1 tablet by mouth once daily.   9/3/2024        The list below reflectives the updated allergy list. Please review each documented allergy for additional clarification and justification.  Allergies  Reviewed by Anushka Washington RN on 9/3/2024        Severity Reactions Comments    Codeine Low Confusion     Midazolam Low Agitation             Below are additional concerns with the patient's PTA list.  Confirmed medications with patient    Juan A Benoit RPh    "

## 2024-09-04 NOTE — PROGRESS NOTES
"Physical Therapy                 Therapy Communication Note    Patient Name: Madan Marquez \"Kevin\"  MRN: [id number]  Today's Date: 9/4/2024     Discipline: Physical Therapy    Screen Reason: Other (Comment) RN reports patient has been up independently. Discussed with patient and spouse, no concerns related to mobility, no need for equipment. Pt reports he has been up in room independently, using restroom and getting dressed. No skilled PT concerns, d/c.    Screen: Cancel    "

## 2024-09-04 NOTE — H&P
Subjective   Subjective:   HPI:  Kevin Marquez is a 68 y.o. male with a PMH of COPD, urothelial carcinoma s/p radical cystoprostatectomy, BPH, T2DM, HLD, HTN who presented to [facility] with c/o fever and left knee pain. He has been having left knee pain for a few weeks and received a prednisone taper which improved the pain. 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 He ended the prednisone taper this weekend. Today he started to experience severe knee pain that was worse than before. He stated that he didn't feel like himself when driving home from work and also had a fever of 102 F per wife. He denies erythema or swelling of the knee, chest pain, SOB, abdominal pain, diarrhea, nausea, or vomiting.     ED COURSE:  Vitals:   - /80 ,  , RR 18 , SpO2 96, T 37.8C  Labs:  - WBC 18.4, HGB 15.1,   - Na 136, Cl 101, K 3.9, bicarb 26, BUN 33, Cr 1.29  - Lactate 2.4 -> 1.9  - CRP 1.04 and ESR 22  - Uric acid 8.7  - INR 1.0 and protime 11.7  - Urinalysis: turbid, 1+ nitrite, negative LE, 1+ bacteria, 11-20 WBC  Imaging:  - left knee Xray: No acute bony abnormalities. No definite change is similar compared to the prior exam.  - CXR: No acute cardiopulmonary disease.   - Left lower extremity venous doppler: No evidence for DVT within the left lower extremity.   Interventions:   - In the ED, he received tylenol 975 mg, ceftriaxone 2 g IV, Toradol 15 mg, NS 2 L bolus    PMH: as per HPI.  Medications: reviewed.    Code Status: DNR    ED Course:   Vitals:  /70   Pulse 81   Temp 37.2 °C (99 °F)   Resp (!) 24   Wt 104 kg (230 lb)   SpO2 95%     Labs:  Lab Results   Component Value Date    WBC 18.4 (H) 09/03/2024    HGB 15.1 09/03/2024    HCT 46.3 09/03/2024    MCV 91 09/03/2024     09/03/2024     Lab Results   Component Value Date    GLUCOSE 162 (H) 09/03/2024    CALCIUM 9.9 09/03/2024     09/03/2024    K 3.9 09/03/2024    CO2 26 09/03/2024     09/03/2024     "BUN 33 (H) 09/03/2024    CREATININE 1.29 09/03/2024     Lab Results   Component Value Date    TROPHS 5 10/10/2022     Lab Results   Component Value Date    BNP 20 05/08/2019   No results found for: \"DDIMERVTE\"  Imaging:  Lower extremity venous duplex left   Final Result   No evidence for DVT within the left lower extremity.   Signed by Chris Gilbert DO      XR knee left 3 views   Final Result   No acute bony abnormalities.  No definite change is similar compared   to the prior exam..   Signed by Kristian Wood MD      XR chest 1 view   Final Result   No acute cardiopulmonary disease.   Signed by Kristian Wood MD         Interventions:  Medications   acetaminophen (Tylenol) tablet 975 mg (975 mg oral Given 9/3/24 1647)   ketorolac (Toradol) injection 15 mg (15 mg intravenous Given 9/3/24 1647)   sodium chloride 0.9 % bolus 2,000 mL (0 mL intravenous Stopped 9/3/24 1812)   cefTRIAXone (Rocephin) 2 g in dextrose (iso) IV 50 mL (0 g intravenous Stopped 9/3/24 1944)       Past Medical History:  He has a past medical history of Abdominal distension (gaseous) (05/08/2019), Acute lower UTI (04/19/2023), Acute maxillary sinusitis (04/19/2023), Acute postoperative pain (01/17/2024), Bacteremia (10/12/2022), Body mass index (BMI) 35.0-35.9, adult (04/07/2021), Contact with and (suspected) exposure to covid-19 (10/27/2022), Diverticulitis (04/23/2024), Dysfunction of eustachian tube (01/17/2024), Elevated blood-pressure reading, without diagnosis of hypertension (11/11/2016), Hematuria (04/19/2023), Hypoxia (01/17/2024), Ingrowing nail (08/27/2017), Injury of kidney (10/27/2022), Leukocytosis (04/04/2023), Localized edema (03/15/2019), Morbid (severe) obesity due to excess calories (Multi) (04/07/2021), Other chest pain (05/17/2019), Other transient cerebral ischemic attacks and related syndromes, Personal history of diseases of the blood and blood-forming organs and certain disorders involving the immune mechanism " (05/17/2019), Personal history of other diseases of the nervous system and sense organs (01/13/2020), Personal history of other diseases of the respiratory system (11/06/2017), Personal history of other diseases of the respiratory system (06/25/2019), Personal history of other diseases of the respiratory system (02/08/2016), Personal history of other endocrine, nutritional and metabolic disease (10/06/2021), Personal history of other specified conditions (01/22/2019), Personal history of other specified conditions (08/15/2018), Personal history of transient ischemic attack (TIA), and cerebral infarction without residual deficits (05/05/2017), Septic shock (Multi) (01/17/2024), and Unspecified symptoms and signs involving the genitourinary system (05/09/2019).    Past Surgical History:  He has a past surgical history that includes Other surgical history (11/06/2017); Tonsillectomy (11/06/2017); and Other surgical history (09/19/2022).    Social History:  He reports that he has quit smoking. His smoking use included cigarettes. He has never used smokeless tobacco. He reports that he does not currently use alcohol. He reports that he does not use drugs.    Family History:  Family History   Problem Relation Name Age of Onset    GI problems Mother      Heart failure Mother      Heart disease Father      Heart attack Father       Allergies:  Codeine and Midazolam    Home Medications:  (Not in a hospital admission)    Review Of Systems:  11-point ROS was performed and is negative except as noted below and in the HPI.     Review of Systems   Constitutional:  Positive for fever.   Respiratory:  Negative for shortness of breath and wheezing.    Cardiovascular:  Negative for chest pain, palpitations and leg swelling.   Gastrointestinal:  Negative for constipation, diarrhea, nausea and vomiting.   Genitourinary:  Negative for flank pain.   Musculoskeletal:  Positive for arthralgias. Negative for joint swelling.        Objective  "  Objective:     /70   Pulse 81   Temp 37.2 °C (99 °F)   Resp (!) 24   Ht 1.778 m (5' 10\")   Wt 104 kg (230 lb)   SpO2 95%   BMI 33.00 kg/m²     Physical Exam  Constitutional:       General: He is not in acute distress.     Appearance: Normal appearance. He is not ill-appearing.   Cardiovascular:      Rate and Rhythm: Normal rate and regular rhythm.      Pulses: Normal pulses.      Heart sounds: Normal heart sounds. No murmur heard.     No gallop.   Pulmonary:      Effort: No respiratory distress.      Breath sounds: Normal breath sounds. No wheezing.   Abdominal:      General: Abdomen is flat. Bowel sounds are normal. There is no distension.      Palpations: Abdomen is soft.      Tenderness: There is no abdominal tenderness. There is no guarding.   Genitourinary:     Comments: Conduit bag in right abdomen draining yellow urine  Musculoskeletal:         General: Tenderness (left infrapatellar) present. No swelling.      Comments: No erythema or swelling bilateral knees   Neurological:      Mental Status: He is alert.       Lab Work:     Lab Results   Component Value Date    WBC 18.4 (H) 09/03/2024    HGB 15.1 09/03/2024    HCT 46.3 09/03/2024    MCV 91 09/03/2024     09/03/2024     Lab Results   Component Value Date    GLUCOSE 162 (H) 09/03/2024    CALCIUM 9.9 09/03/2024     09/03/2024    K 3.9 09/03/2024    CO2 26 09/03/2024     09/03/2024    BUN 33 (H) 09/03/2024    CREATININE 1.29 09/03/2024     POC HEMOGLOBIN A1c   Date Value Ref Range Status   04/25/2024 7.5 (A) 4.2 - 6.5 % Final     Hemoglobin A1C   Date Value Ref Range Status   02/03/2020 6.6 % Final     Comment:          Diagnosis of Diabetes-Adults   Non-Diabetic: < or = 5.6%   Increased risk for developing diabetes: 5.7-6.4%   Diagnostic of diabetes: > or = 6.5%  .       Monitoring of Diabetes                Age (y)     Therapeutic Goal (%)   Adults:          >18           <7.0   Pediatrics:    13-18           <7.5           "         7-12           <8.0                   0- 6            7.5-8.5   American Diabetes Association. Diabetes Care 33(S1), Jan 2010.     11/06/2017 6.4 % Final     Comment:          Diagnosis of Diabetes-Adults   Non-Diabetic: < or = 5.6%   Increased risk for developing diabetes: 5.7-6.4%   Diagnostic of diabetes: > or = 6.5%  .       Monitoring of Diabetes                Age (y)     Therapeutic Goal (%)   Adults:          >18           <7.0   Pediatrics:    13-18           <7.5                   7-12           <8.0                   0- 6            7.5-8.5   American Diabetes Association. Diabetes Care 33(S1), Jan 2010.       TSH   Date Value Ref Range Status   01/22/2019 1.88 0.44 - 3.98 mIU/L Final     Comment:      TSH testing is performed using different testing    methodology at [facility] than at other    [facility] hospitals. Direct result comparisons should    only be made within the same method.  .   Patients receiving more than 5 mg/day of biotin may have interference   in test results.  A sample should be taken no sooner than eight hours   after  previous dose. Contact [phone] for additional information.       Cultures:   No results found for the last 90 days.    Images:     Lower extremity venous duplex left   Final Result   No evidence for DVT within the left lower extremity.   Signed by Chris Gilbert DO      XR knee left 3 views   Final Result   No acute bony abnormalities.  No definite change is similar compared   to the prior exam..   Signed by Kristian Wood MD      XR chest 1 view   Final Result   No acute cardiopulmonary disease.   Signed by Kristian Wood MD            Assessment & Plan:     Kevin Marquez is a 68 y.o. male with a PMH of COPD, urothelial carcinoma s/p radical cystoprostatectomy, BPH, T2DM, HLD, HTN. Admitted for sepsis 2/2 UTI and left knee pain.     ACUTE MEDICAL ISSUES:  #sepsis 2/2 UTI:   - Patient presented with fever, leukocytosis, and  tachycardia  - Currently has an anterior conduit bag and patient reports that urine color has not changed  - Urinalysis was positive for WBCs, bacteria, and nitrite    PLAN:  - Continue ceftriaxone (previous urine cultures were sensitive - E coli and klebsiella)  - Urine and blood cultures pending    #left knee pain:   - 2 week history of severe left knee pain that improved with a 10 day course of prednisone. Now knee pain is back after stopping prednisone  - No erythema, swelling, or effusion present. PE not consistent with septic arthritis  - Xray was negative  - ESR and CRP mildly elevated which may be due to UTI   PLAN:  - PT consult  - Follow up outpatient with ortho   - Continue pain regimen (lidocaine patch, tylenol 975 mg q6h PRN for mild, toradol q6h PRN for moderate, and oxycodone 2.5 mg q6h PRN for severe)      CHRONIC MEDICAL ISSUES:  #HTN - continue home hydrochlorothiazide 12.5 mg and losartan 100 mg   #COPD - continue home mometasone inhaler and tiotropium inhaler       Fluid: Replete PRN  Electrolytes: Replete PRN  Nutrition: Diabetic diet  GI ppx: None  DVT/PE ppx: Lovenox  Abx: Ceftriaxone   IV Lines: PIV  O2: RA    Dispo: 67 yo male presenting with sepsis 2/2 UTI and left knee pain. Estimated length of stay 2 days.    See Robin, PGY-1  Transitional Year

## 2024-09-04 NOTE — HOSPITAL COURSE
Brief HPI  Kevin Marquez is a 68 y.o. male with a PMH of COPD, urothelial carcinoma s/p radical cystoprostatectomy, BPH, T2DM, HLD, HTN who presented to [facility] with c/o fever and left knee pain. He has been having left knee pain for a few weeks and received a prednisone taper which improved the pain. 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 He stated that he didn't feel like himself when driving home from work and also had a fever of 102 F per wife.     ED Course  In the ED, patient had a leukocytosis of 18.4. UA showed 1+ nitrites, 1+ bacteria, and 11-20 WBCs. Left knee x-ray showed no acute abnormalities. Chest x-ray was unremarkable. Let lower extremity DVT US was negative. HE was given Tylenol, Ceftriaxone 2g, Toradol, and 2L NS. Admitted to the floor for further management.       Floor Course   On arrival to the floor, he was continued on Ceftriaxone. His knee pain improved with Lidocaine patch. He was given additional IVF for a slight increase in Cr and suspected poor oral intake. He was medically stable and discharged home on 9/4/24. Given script for Ciprofloxacin 500mg BID x7 days. Instructed to follow up with PCP and Orthopedic Surgery. Will call patient post-discharge if urine culture positive.

## 2024-09-04 NOTE — DISCHARGE INSTRUCTIONS
Please, take your home medications as instructed after being discharged from the hospital.     NEW MEDICATIONS:  Start Ciprofloxacin 500mg twice daily x 7 days     OTHER INSTRUCTIONS:  Please follow up with PCP and Orthopedic Surgery      Please, follow-up with your PCP and Orthopedic Surgery within 7 to 14 days after leaving the hospital. /appointment services has been requested to make an appointment for you, however if you do not hear back from them within 1 to 2 days, please call your primary physician's office to schedule an appointment. Bring your photo ID and insurance card to your appointment.   [facility]  services can be reached at [phone].     If you experience any worsening symptoms or have any concerns, please contact your primary care provider to schedule an appointment. If you cannot get in touch with your primary care physician, please return to the nearest emergency room or urgent care clinic for an evaluation and treatment.     Thank you for choosing [facility] and allowing us to partake in your medical care!     - Your [facility] inpatient primary care team.

## 2024-09-05 ENCOUNTER — PATIENT OUTREACH (OUTPATIENT)
Dept: PRIMARY CARE | Facility: CLINIC | Age: 69
End: 2024-09-05
Payer: MEDICARE

## 2024-09-05 LAB
ATRIAL RATE: 105 BPM
P AXIS: 55 DEGREES
P OFFSET: 183 MS
P ONSET: 127 MS
PR INTERVAL: 178 MS
Q ONSET: 216 MS
QRS COUNT: 17 BEATS
QRS DURATION: 120 MS
QT INTERVAL: 356 MS
QTC CALCULATION(BAZETT): 470 MS
QTC FREDERICIA: 428 MS
R AXIS: -73 DEGREES
T AXIS: 34 DEGREES
T OFFSET: 394 MS
VENTRICULAR RATE: 105 BPM

## 2024-09-05 NOTE — PROGRESS NOTES
Discharge Facility: [facility]  Discharge Diagnosis: Sepsis  Admission Date: 3 Sep 24  Discharge Date: 4 Sep 24    PCP Appointment Date: Pt declining to move PCP appt to sooner date at this time.   Specialist Appointment Date: Wife stating they have an appt to see Ortho next week  Hospital Encounter and Summary Linked: Yes    See discharge assessment below for further details     Engagement  Call Start Time: 0949 (9/5/2024  9:59 AM)    Medications  Medications reviewed with patient/caregiver?: Yes (9/5/2024  9:59 AM)  Is the patient having any side effects they believe may be caused by any medication additions or changes?: No (9/5/2024  9:59 AM)  Does the patient have all medications ordered at discharge?: Yes (9/5/2024  9:59 AM)  Prescription Comments: None (9/5/2024  9:59 AM)  Is the patient taking all medications as directed (includes completed medication regime)?: Yes (9/5/2024  9:59 AM)  Medication Comments: None (9/5/2024  9:59 AM)    Appointments  Does the patient have a primary care provider?: Yes (Pt not wishing to move PCP appt to sooner date at this time.) (9/5/2024  9:59 AM)  Has the patient kept scheduled appointments due by today?: Yes (9/5/2024  9:59 AM)    Self Management  What is the home health agency?: N/A (9/5/2024  9:59 AM)  Has home health visited the patient within 72 hours of discharge?: Not applicable (9/5/2024  9:59 AM)  What Durable Medical Equipment (DME) was ordered?: N/A (9/5/2024  9:59 AM)    Patient Teaching  Does the patient have access to their discharge instructions?: Yes (9/5/2024  9:59 AM)  Care Management Interventions: Reviewed instructions with patient (9/5/2024  9:59 AM)  What is the patient's perception of their health status since discharge?: Improving (9/5/2024  9:59 AM)  Is the patient/caregiver able to teach back the hierarchy of who to call/visit for symptoms/problems? PCP, Specialist, Home Health nurse, Urgent Care, ED, 911: Yes (9/5/2024  9:59 AM)  Patient/Caregiver  Education Comments: None (9/5/2024  9:59 AM)    Wrap Up  Wrap Up Additional Comments: None (9/5/2024  9:59 AM)  Call End Time: 0959 (9/5/2024  9:59 AM)    Pt declining to move PCP appt to sooner date at this time. Wife stating that they have an appt to see Ortho next week and will see PCP at already scheduled appt time. Wife stating patient is doing well and has no questions regarding medications or discharge instruction at this time.

## 2024-09-07 LAB
BACTERIA UR CULT: ABNORMAL
BACTERIA UR CULT: ABNORMAL
CARBA5 NEG: ABNORMAL

## 2024-09-07 NOTE — SIGNIFICANT EVENT
Follow Up Phone Call    Outgoing phone call    Spoke to: Madan Marquez Relationship:self   Phone number: [phone]      Outcome: contacted patient/ family   Chief Complaint   Patient presents with    Knee Pain    Fever          Diagnosis:Not applicable    States he is feeling better. No further questions or concerns.

## 2024-09-08 LAB
BACTERIA BLD CULT: NORMAL
BACTERIA BLD CULT: NORMAL

## 2024-09-11 DIAGNOSIS — E11.69 TYPE 2 DIABETES MELLITUS WITH OTHER SPECIFIED COMPLICATION, UNSPECIFIED WHETHER LONG TERM INSULIN USE (MULTI): ICD-10-CM

## 2024-09-11 DIAGNOSIS — J44.9 COPD, MILD (MULTI): Primary | ICD-10-CM

## 2024-09-19 ENCOUNTER — DOCUMENTATION (OUTPATIENT)
Dept: PRIMARY CARE | Facility: CLINIC | Age: 69
End: 2024-09-19
Payer: MEDICARE

## 2024-09-23 RX ORDER — LANCETS 30 GAUGE
EACH MISCELLANEOUS
COMMUNITY
Start: 2024-04-25

## 2024-09-26 ENCOUNTER — LAB (OUTPATIENT)
Dept: LAB | Facility: LAB | Age: 69
End: 2024-09-26
Payer: MEDICARE

## 2024-09-26 ENCOUNTER — APPOINTMENT (OUTPATIENT)
Dept: PRIMARY CARE | Facility: CLINIC | Age: 69
End: 2024-09-26
Payer: MEDICARE

## 2024-09-26 VITALS
DIASTOLIC BLOOD PRESSURE: 67 MMHG | HEART RATE: 68 BPM | WEIGHT: 242 LBS | HEIGHT: 70 IN | SYSTOLIC BLOOD PRESSURE: 136 MMHG | OXYGEN SATURATION: 94 % | TEMPERATURE: 98.4 F | BODY MASS INDEX: 34.65 KG/M2

## 2024-09-26 DIAGNOSIS — E11.9 TYPE 2 DIABETES MELLITUS WITHOUT COMPLICATION, WITHOUT LONG-TERM CURRENT USE OF INSULIN (MULTI): ICD-10-CM

## 2024-09-26 DIAGNOSIS — C67.9 BLADDER CARCINOMA (MULTI): ICD-10-CM

## 2024-09-26 DIAGNOSIS — R73.9 HYPERGLYCEMIA: ICD-10-CM

## 2024-09-26 DIAGNOSIS — I10 PRIMARY HYPERTENSION: ICD-10-CM

## 2024-09-26 DIAGNOSIS — Z93.6 PRESENCE OF UROSTOMY (MULTI): ICD-10-CM

## 2024-09-26 DIAGNOSIS — C67.9 BLADDER CARCINOMA (MULTI): Primary | ICD-10-CM

## 2024-09-26 PROBLEM — R53.1 WEAKNESS: Status: RESOLVED | Noted: 2024-01-17 | Resolved: 2024-09-26

## 2024-09-26 LAB
ANION GAP SERPL CALC-SCNC: 15 MMOL/L (ref 10–20)
BASOPHILS # BLD AUTO: 0.09 X10*3/UL (ref 0–0.1)
BASOPHILS NFR BLD AUTO: 0.8 %
BUN SERPL-MCNC: 22 MG/DL (ref 6–23)
CALCIUM SERPL-MCNC: 9.6 MG/DL (ref 8.6–10.3)
CHLORIDE SERPL-SCNC: 103 MMOL/L (ref 98–107)
CO2 SERPL-SCNC: 24 MMOL/L (ref 21–32)
CREAT SERPL-MCNC: 1.11 MG/DL (ref 0.5–1.3)
EGFRCR SERPLBLD CKD-EPI 2021: 71 ML/MIN/1.73M*2
EOSINOPHIL # BLD AUTO: 0.2 X10*3/UL (ref 0–0.4)
EOSINOPHIL NFR BLD AUTO: 1.8 %
ERYTHROCYTE [DISTWIDTH] IN BLOOD BY AUTOMATED COUNT: 13.2 % (ref 11.5–14.5)
GLUCOSE SERPL-MCNC: 91 MG/DL (ref 74–99)
HCT VFR BLD AUTO: 43.8 % (ref 41–52)
HGB BLD-MCNC: 14.5 G/DL (ref 13.5–17.5)
IMM GRANULOCYTES # BLD AUTO: 0.14 X10*3/UL (ref 0–0.5)
IMM GRANULOCYTES NFR BLD AUTO: 1.3 % (ref 0–0.9)
LYMPHOCYTES # BLD AUTO: 2.25 X10*3/UL (ref 0.8–3)
LYMPHOCYTES NFR BLD AUTO: 20.8 %
MCH RBC QN AUTO: 29.7 PG (ref 26–34)
MCHC RBC AUTO-ENTMCNC: 33.1 G/DL (ref 32–36)
MCV RBC AUTO: 90 FL (ref 80–100)
MONOCYTES # BLD AUTO: 1.07 X10*3/UL (ref 0.05–0.8)
MONOCYTES NFR BLD AUTO: 9.9 %
NEUTROPHILS # BLD AUTO: 7.08 X10*3/UL (ref 1.6–5.5)
NEUTROPHILS NFR BLD AUTO: 65.4 %
NRBC BLD-RTO: 0 /100 WBCS (ref 0–0)
PLATELET # BLD AUTO: 305 X10*3/UL (ref 150–450)
POC HEMOGLOBIN A1C: 7.4 % (ref 4.2–6.5)
POTASSIUM SERPL-SCNC: 4.3 MMOL/L (ref 3.5–5.3)
RBC # BLD AUTO: 4.88 X10*6/UL (ref 4.5–5.9)
SODIUM SERPL-SCNC: 138 MMOL/L (ref 136–145)
WBC # BLD AUTO: 10.8 X10*3/UL (ref 4.4–11.3)

## 2024-09-26 PROCEDURE — 3048F LDL-C <100 MG/DL: CPT | Performed by: FAMILY MEDICINE

## 2024-09-26 PROCEDURE — 1036F TOBACCO NON-USER: CPT | Performed by: FAMILY MEDICINE

## 2024-09-26 PROCEDURE — 90662 IIV NO PRSV INCREASED AG IM: CPT | Performed by: FAMILY MEDICINE

## 2024-09-26 PROCEDURE — 1159F MED LIST DOCD IN RCRD: CPT | Performed by: FAMILY MEDICINE

## 2024-09-26 PROCEDURE — 85025 COMPLETE CBC W/AUTO DIFF WBC: CPT

## 2024-09-26 PROCEDURE — G2211 COMPLEX E/M VISIT ADD ON: HCPCS | Performed by: FAMILY MEDICINE

## 2024-09-26 PROCEDURE — 1160F RVW MEDS BY RX/DR IN RCRD: CPT | Performed by: FAMILY MEDICINE

## 2024-09-26 PROCEDURE — 4010F ACE/ARB THERAPY RXD/TAKEN: CPT | Performed by: FAMILY MEDICINE

## 2024-09-26 PROCEDURE — 80048 BASIC METABOLIC PNL TOTAL CA: CPT

## 2024-09-26 PROCEDURE — 3078F DIAST BP <80 MM HG: CPT | Performed by: FAMILY MEDICINE

## 2024-09-26 PROCEDURE — 99214 OFFICE O/P EST MOD 30 MIN: CPT | Performed by: FAMILY MEDICINE

## 2024-09-26 PROCEDURE — 3008F BODY MASS INDEX DOCD: CPT | Performed by: FAMILY MEDICINE

## 2024-09-26 PROCEDURE — G0008 ADMIN INFLUENZA VIRUS VAC: HCPCS | Performed by: FAMILY MEDICINE

## 2024-09-26 PROCEDURE — 83036 HEMOGLOBIN GLYCOSYLATED A1C: CPT | Performed by: FAMILY MEDICINE

## 2024-09-26 PROCEDURE — 36415 COLL VENOUS BLD VENIPUNCTURE: CPT

## 2024-09-26 PROCEDURE — 1111F DSCHRG MED/CURRENT MED MERGE: CPT | Performed by: FAMILY MEDICINE

## 2024-09-26 PROCEDURE — 3075F SYST BP GE 130 - 139MM HG: CPT | Performed by: FAMILY MEDICINE

## 2024-09-26 ASSESSMENT — ENCOUNTER SYMPTOMS: SHORTNESS OF BREATH: 0

## 2024-09-26 NOTE — PATIENT INSTRUCTIONS
Add on januvia  daily to reduce meal time sugars     Diabetes plan:   With diabetes it is recommended that you follow a low sugar low-carb  ADA diet.  Usually 1670-9182 khalif per day as well as recommended.  Take your medications as directed.  If you have a glucometer and are on oral medications you should be checking your sugars several times per week.  If you are on insulin you should be taking your sugars several times per day.    Your goal should be to have fasting sugars levels : , 2 hrs post meal <170.  You need to see an eye doctor yearly to assess your retinas for diabetic changes yearly.  It is important to keep your weight under control and exercise regularly.  With diabetes you should be seen in the office every 3 months.

## 2024-09-26 NOTE — PROGRESS NOTES
"Subjective   Patient ID: Kevin Marquez is a 69 y.o. male who presents for Follow-up.  Discharge Facility: [facility] Diagnosis: Sepsis  Admission Date: 3 Sep 24  Discharge Date: 4 Sep 24  TCM PC: 9/5/24  Pt wonders if his bursitis in his left knee had anything to do with the sepsis.   Wife states they were referred to the clinical pharmacy team and she is not certain this is something they need.     follow-up on hospitalization recently admitted with sepsis, urosepsis: Pseudomonas and another organism  Patient has had infections was on preventative antibiotics  Was sent home on Cipro     Bladder resection status post bladder cancer  , tumor markers negative, recent heme-onc visit 4/18, stable  Signatera test was normal     Diabetes mellitus: Not on medications in the past  Elevated HgA1c :  AM  ,  post meal 180-200   Sugars were high in the hospital       Hypertension  : Patient is taking blood pressure medications as directed.    Pt denies Chest Pain or Shortness of Breath      COPD: Stable, seeing pulmonology                        Review of Systems   Respiratory:  Negative for shortness of breath.    Cardiovascular:  Negative for chest pain.       Objective   /67   Pulse 68   Temp 36.9 °C (98.4 °F)   Ht 1.778 m (5' 10\")   Wt 110 kg (242 lb)   SpO2 94%   BMI 34.72 kg/m²     Physical Exam  Constitutional:       Appearance: Normal appearance. He is well-developed.   Cardiovascular:      Rate and Rhythm: Normal rate and regular rhythm.      Heart sounds: Normal heart sounds. No murmur heard.  Pulmonary:      Effort: Pulmonary effort is normal.      Breath sounds: Normal breath sounds.   Abdominal:      General: Abdomen is flat. There is no distension.      Palpations: Abdomen is soft.      Tenderness: There is no abdominal tenderness.      Comments: Ostomy: Skin intact  Nontender   Neurological:      General: No focal deficit present.      Mental Status: He is alert and oriented to " person, place, and time.   Psychiatric:         Mood and Affect: Mood normal.         Behavior: Behavior normal.         Assessment/Plan   Problem List Items Addressed This Visit             ICD-10-CM    Hyperglycemia R73.9    Relevant Orders    POCT glycosylated hemoglobin (Hb A1C) manually resulted (Completed)    Basic metabolic panel    CBC and Auto Differential    Hypertension I10    Relevant Orders    Basic metabolic panel    CBC and Auto Differential    Presence of urostomy (Multi) Z93.6    Bladder carcinoma (Multi) - Primary C67.9    Relevant Orders    Basic metabolic panel    CBC and Auto Differential    Type 2 diabetes mellitus (Multi) E11.9    Relevant Medications    SITagliptin phosphate (Januvia) 100 mg tablet    Other Relevant Orders    Referral to Nutrition Services    Basic metabolic panel    CBC and Auto Differential

## 2024-10-03 LAB
COMMENTS - MP RESULT TYPE: NORMAL
SCAN RESULT: NORMAL

## 2024-10-18 ENCOUNTER — OFFICE VISIT (OUTPATIENT)
Dept: HEMATOLOGY/ONCOLOGY | Facility: CLINIC | Age: 69
End: 2024-10-18
Payer: MEDICARE

## 2024-10-18 VITALS
TEMPERATURE: 97.5 F | SYSTOLIC BLOOD PRESSURE: 132 MMHG | WEIGHT: 238.65 LBS | RESPIRATION RATE: 16 BRPM | BODY MASS INDEX: 35.35 KG/M2 | HEIGHT: 69 IN | DIASTOLIC BLOOD PRESSURE: 74 MMHG | HEART RATE: 62 BPM | OXYGEN SATURATION: 96 %

## 2024-10-18 DIAGNOSIS — C67.9 BLADDER CARCINOMA (MULTI): ICD-10-CM

## 2024-10-18 PROCEDURE — 4010F ACE/ARB THERAPY RXD/TAKEN: CPT | Performed by: INTERNAL MEDICINE

## 2024-10-18 PROCEDURE — 3048F LDL-C <100 MG/DL: CPT | Performed by: INTERNAL MEDICINE

## 2024-10-18 PROCEDURE — 3078F DIAST BP <80 MM HG: CPT | Performed by: INTERNAL MEDICINE

## 2024-10-18 PROCEDURE — 99214 OFFICE O/P EST MOD 30 MIN: CPT | Performed by: INTERNAL MEDICINE

## 2024-10-18 PROCEDURE — 3008F BODY MASS INDEX DOCD: CPT | Performed by: INTERNAL MEDICINE

## 2024-10-18 PROCEDURE — G2211 COMPLEX E/M VISIT ADD ON: HCPCS | Performed by: INTERNAL MEDICINE

## 2024-10-18 PROCEDURE — 1126F AMNT PAIN NOTED NONE PRSNT: CPT | Performed by: INTERNAL MEDICINE

## 2024-10-18 PROCEDURE — 3075F SYST BP GE 130 - 139MM HG: CPT | Performed by: INTERNAL MEDICINE

## 2024-10-18 SDOH — ECONOMIC STABILITY: FOOD INSECURITY: WITHIN THE PAST 12 MONTHS, YOU WORRIED THAT YOUR FOOD WOULD RUN OUT BEFORE YOU GOT THE MONEY TO BUY MORE.: NEVER TRUE

## 2024-10-18 SDOH — ECONOMIC STABILITY: FOOD INSECURITY: WITHIN THE PAST 12 MONTHS, THE FOOD YOU BOUGHT JUST DIDN'T LAST AND YOU DIDN'T HAVE MONEY TO GET MORE.: NEVER TRUE

## 2024-10-18 ASSESSMENT — PAIN SCALES - GENERAL: PAINLEVEL_OUTOF10: 0-NO PAIN

## 2024-10-18 NOTE — PROGRESS NOTES
"Patient ID: Kevin Marquez is a 69 y.o. male.  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 History of BCG for  non muscle invasive bladder cancer.  CT in follow up showed only bladder wall thickening which is unchanged.  Signatera has been negative.  When last seen he was septic and sent to ER for urosepsis.  CT at that time 10/10/22 was negative for metastatic  disease.   Review of Systems   HENT:  Negative.     Respiratory: Negative.     Cardiovascular: Negative.         Objective   BSA: 2.3 meters squared  /74 (BP Location: Left arm, Patient Position: Sitting, BP Cuff Size: Large adult)   Pulse 62   Temp 36.4 °C (97.5 °F) (Temporal)   Resp 16   Ht (S) 1.757 m (5' 9.17\")   Wt 108 kg (238 lb 10.4 oz)   SpO2 96%   BMI 35.07 kg/m²      has a past medical history of Abdominal distension (gaseous) (05/08/2019), Acute lower UTI (04/19/2023), Acute maxillary sinusitis (04/19/2023), Acute postoperative pain (01/17/2024), Bacteremia (10/12/2022), Body mass index (BMI) 35.0-35.9, adult (04/07/2021), Contact with and (suspected) exposure to covid-19 (10/27/2022), Diverticulitis (04/23/2024), Dysfunction of eustachian tube (01/17/2024), Elevated blood-pressure reading, without diagnosis of hypertension (11/11/2016), Hematuria (04/19/2023), Hypoxia (01/17/2024), Ingrowing nail (08/27/2017), Injury of kidney (10/27/2022), Leukocytosis (04/04/2023), Localized edema (03/15/2019), Morbid (severe) obesity due to excess calories (Multi) (04/07/2021), Other chest pain (05/17/2019), Other transient cerebral ischemic attacks and related syndromes, Personal history of diseases of the blood and blood-forming organs and " "certain disorders involving the immune mechanism (05/17/2019), Personal history of other diseases of the nervous system and sense organs (01/13/2020), Personal history of other diseases of the respiratory system (11/06/2017), Personal history of other diseases of the respiratory system (06/25/2019), Personal history of other diseases of the respiratory system (02/08/2016), Personal history of other endocrine, nutritional and metabolic disease (10/06/2021), Personal history of other specified conditions (01/22/2019), Personal history of other specified conditions (08/15/2018), Personal history of transient ischemic attack (TIA), and cerebral infarction without residual deficits (05/05/2017), Septic shock (Multi) (01/17/2024), and Unspecified symptoms and signs involving the genitourinary system (05/09/2019).   has a past surgical history that includes Other surgical history (11/06/2017); Tonsillectomy (11/06/2017); and Other surgical history (09/19/2022).  Family History   Problem Relation Name Age of Onset    GI problems Mother      Heart failure Mother      Heart disease Father      Heart attack Father       Oncology History    No history exists.       Madan Marquez \"Kevin\"  reports that he has quit smoking. His smoking use included cigarettes. He has never used smokeless tobacco.  He  reports that he does not currently use alcohol.  He  reports no history of drug use.    Physical Exam  Constitutional:       Appearance: Normal appearance.   HENT:      Head: Normocephalic and atraumatic.   Eyes:      Extraocular Movements: Extraocular movements intact.      Pupils: Pupils are equal, round, and reactive to light.   Abdominal:      General: Abdomen is flat.      Palpations: Abdomen is soft.   Neurological:      Mental Status: He is alert.         WBC   Date/Time Value Ref Range Status   09/26/2024 11:35 AM 10.8 4.4 - 11.3 x10*3/uL Final   09/04/2024 05:45 AM 13.5 (H) 4.4 - 11.3 x10*3/uL Final   09/03/2024 04:45 " "PM 18.4 (H) 4.4 - 11.3 x10*3/uL Final     nRBC   Date Value Ref Range Status   09/26/2024 0.0 0.0 - 0.0 /100 WBCs Final   09/04/2024 0.0 0.0 - 0.0 /100 WBCs Final   09/03/2024 0.0 0.0 - 0.0 /100 WBCs Final     RBC   Date Value Ref Range Status   09/26/2024 4.88 4.50 - 5.90 x10*6/uL Final   09/04/2024 4.40 (L) 4.50 - 5.90 x10*6/uL Final   09/03/2024 5.08 4.50 - 5.90 x10*6/uL Final     Hemoglobin   Date Value Ref Range Status   09/26/2024 14.5 13.5 - 17.5 g/dL Final   09/04/2024 13.0 (L) 13.5 - 17.5 g/dL Final   09/03/2024 15.1 13.5 - 17.5 g/dL Final     Hematocrit   Date Value Ref Range Status   09/26/2024 43.8 41.0 - 52.0 % Final   09/04/2024 40.6 (L) 41.0 - 52.0 % Final   09/03/2024 46.3 41.0 - 52.0 % Final     MCV   Date/Time Value Ref Range Status   09/26/2024 11:35 AM 90 80 - 100 fL Final   09/04/2024 05:45 AM 92 80 - 100 fL Final   09/03/2024 04:45 PM 91 80 - 100 fL Final     MCH   Date/Time Value Ref Range Status   09/26/2024 11:35 AM 29.7 26.0 - 34.0 pg Final   09/04/2024 05:45 AM 29.5 26.0 - 34.0 pg Final   09/03/2024 04:45 PM 29.7 26.0 - 34.0 pg Final     MCHC   Date/Time Value Ref Range Status   09/26/2024 11:35 AM 33.1 32.0 - 36.0 g/dL Final   09/04/2024 05:45 AM 32.0 32.0 - 36.0 g/dL Final   09/03/2024 04:45 PM 32.6 32.0 - 36.0 g/dL Final     RDW   Date/Time Value Ref Range Status   09/26/2024 11:35 AM 13.2 11.5 - 14.5 % Final   09/04/2024 05:45 AM 13.5 11.5 - 14.5 % Final   09/03/2024 04:45 PM 13.3 11.5 - 14.5 % Final     Platelets   Date/Time Value Ref Range Status   09/26/2024 11:35  150 - 450 x10*3/uL Final   09/04/2024 05:45  150 - 450 x10*3/uL Final   09/03/2024 04:45  150 - 450 x10*3/uL Final     No results found for: \"MPV\"  Neutrophils %   Date/Time Value Ref Range Status   09/26/2024 11:35 AM 65.4 40.0 - 80.0 % Final   09/04/2024 05:45 AM 75.8 40.0 - 80.0 % Final   09/03/2024 04:45 PM 83.1 40.0 - 80.0 % Final     Immature Granulocytes %, Automated   Date/Time Value Ref Range " Status   09/26/2024 11:35 AM 1.3 (H) 0.0 - 0.9 % Final     Comment:     Immature Granulocyte Count (IG) includes promyelocytes, myelocytes and metamyelocytes but does not include bands. Percent differential counts (%) should be interpreted in the context of the absolute cell counts (cells/UL).   09/04/2024 05:45 AM 0.8 0.0 - 0.9 % Final     Comment:     Immature Granulocyte Count (IG) includes promyelocytes, myelocytes and metamyelocytes but does not include bands. Percent differential counts (%) should be interpreted in the context of the absolute cell counts (cells/UL).   09/03/2024 04:45 PM 0.9 0.0 - 0.9 % Final     Comment:     Immature Granulocyte Count (IG) includes promyelocytes, myelocytes and metamyelocytes but does not include bands. Percent differential counts (%) should be interpreted in the context of the absolute cell counts (cells/UL).     Lymphocytes %   Date/Time Value Ref Range Status   09/26/2024 11:35 AM 20.8 13.0 - 44.0 % Final   09/04/2024 05:45 AM 11.6 13.0 - 44.0 % Final   09/03/2024 04:45 PM 8.2 13.0 - 44.0 % Final     Monocytes %   Date/Time Value Ref Range Status   09/26/2024 11:35 AM 9.9 2.0 - 10.0 % Final   09/04/2024 05:45 AM 9.7 2.0 - 10.0 % Final   09/03/2024 04:45 PM 6.4 2.0 - 10.0 % Final     Eosinophils %   Date/Time Value Ref Range Status   09/26/2024 11:35 AM 1.8 0.0 - 6.0 % Final   09/04/2024 05:45 AM 1.7 0.0 - 6.0 % Final   09/03/2024 04:45 PM 0.9 0.0 - 6.0 % Final     Basophils %   Date/Time Value Ref Range Status   09/26/2024 11:35 AM 0.8 0.0 - 2.0 % Final   09/04/2024 05:45 AM 0.4 0.0 - 2.0 % Final   09/03/2024 04:45 PM 0.5 0.0 - 2.0 % Final     Neutrophils Absolute   Date/Time Value Ref Range Status   09/26/2024 11:35 AM 7.08 (H) 1.60 - 5.50 x10*3/uL Final     Comment:     Percent differential counts (%) should be interpreted in the context of the absolute cell counts (cells/uL).   09/04/2024 05:45 AM 10.25 (H) 1.20 - 7.70 x10*3/uL Final     Comment:     Percent  "differential counts (%) should be interpreted in the context of the absolute cell counts (cells/uL).   09/03/2024 04:45 PM 15.25 (H) 1.20 - 7.70 x10*3/uL Final     Comment:     Percent differential counts (%) should be interpreted in the context of the absolute cell counts (cells/uL).     Immature Granulocytes Absolute, Automated   Date/Time Value Ref Range Status   09/26/2024 11:35 AM 0.14 0.00 - 0.50 x10*3/uL Final   09/04/2024 05:45 AM 0.11 0.00 - 0.70 x10*3/uL Final   09/03/2024 04:45 PM 0.16 0.00 - 0.70 x10*3/uL Final     Lymphocytes Absolute   Date/Time Value Ref Range Status   09/26/2024 11:35 AM 2.25 0.80 - 3.00 x10*3/uL Final   09/04/2024 05:45 AM 1.57 1.20 - 4.80 x10*3/uL Final   09/03/2024 04:45 PM 1.50 1.20 - 4.80 x10*3/uL Final     Monocytes Absolute   Date/Time Value Ref Range Status   09/26/2024 11:35 AM 1.07 (H) 0.05 - 0.80 x10*3/uL Final   09/04/2024 05:45 AM 1.31 (H) 0.10 - 1.00 x10*3/uL Final   09/03/2024 04:45 PM 1.17 (H) 0.10 - 1.00 x10*3/uL Final     Eosinophils Absolute   Date/Time Value Ref Range Status   09/26/2024 11:35 AM 0.20 0.00 - 0.40 x10*3/uL Final   09/04/2024 05:45 AM 0.23 0.00 - 0.70 x10*3/uL Final   09/03/2024 04:45 PM 0.17 0.00 - 0.70 x10*3/uL Final     Basophils Absolute   Date/Time Value Ref Range Status   09/26/2024 11:35 AM 0.09 0.00 - 0.10 x10*3/uL Final   09/04/2024 05:45 AM 0.05 0.00 - 0.10 x10*3/uL Final   09/03/2024 04:45 PM 0.10 0.00 - 0.10 x10*3/uL Final       No components found for: \"PT\"  aPTT   Date/Time Value Ref Range Status   09/03/2024 05:48 PM 28 27 - 38 seconds Final   10/10/2022 11:30 AM 28 26 - 39 sec Final     Comment:       THE APTT IS NO LONGER USED FOR MONITORING     UNFRACTIONATED HEPARIN THERAPY.    FOR MONITORING HEPARIN THERAPY,     USE THE HEPARIN ASSAY.         Assessment/Plan      10/18/2024: Patient seen today no complaints will continue minimal disease testing. Return in 6 months. MRD testing negative for disease recurrence.     Diagnoses and " all orders for this visit:  Bladder carcinoma (Multi)  -     Clinic Appointment Request Follow Up; CHELSEY SAENZ  -     Clinic Appointment Request Follow Up; HCELSEY SAENZ; Future           Chelsey Saenz MD

## 2024-10-18 NOTE — PATIENT INSTRUCTIONS
Today you met with Dr. Valencia.  Recent labs were discussed and questions answered.  Dr. Valencia would like to see you again in 6 months but would like you to continue getting your Signatera labs every 3 months. Scheduling orders were placed to reflect this.   Please do not hesitate to call our office should you have any further questions or concerns, [phone]. Thank you.

## 2024-10-21 ASSESSMENT — ENCOUNTER SYMPTOMS
RESPIRATORY NEGATIVE: 1
CARDIOVASCULAR NEGATIVE: 1

## 2024-11-26 ENCOUNTER — LAB (OUTPATIENT)
Dept: LAB | Facility: LAB | Age: 69
End: 2024-11-26
Payer: MEDICARE

## 2024-11-26 DIAGNOSIS — C67.9 MALIGNANT NEOPLASM OF URINARY BLADDER, UNSPECIFIED SITE (MULTI): ICD-10-CM

## 2024-11-26 LAB
ALBUMIN SERPL BCP-MCNC: 4.1 G/DL (ref 3.4–5)
ALP SERPL-CCNC: 59 U/L (ref 33–136)
ALT SERPL W P-5'-P-CCNC: 24 U/L (ref 10–52)
ANION GAP SERPL CALC-SCNC: 13 MMOL/L (ref 10–20)
AST SERPL W P-5'-P-CCNC: 16 U/L (ref 9–39)
BILIRUB SERPL-MCNC: 0.6 MG/DL (ref 0–1.2)
BUN SERPL-MCNC: 19 MG/DL (ref 6–23)
CALCIUM SERPL-MCNC: 9.4 MG/DL (ref 8.6–10.3)
CHLORIDE SERPL-SCNC: 101 MMOL/L (ref 98–107)
CO2 SERPL-SCNC: 28 MMOL/L (ref 21–32)
CREAT SERPL-MCNC: 1.23 MG/DL (ref 0.5–1.3)
EGFRCR SERPLBLD CKD-EPI 2021: 63 ML/MIN/1.73M*2
ERYTHROCYTE [DISTWIDTH] IN BLOOD BY AUTOMATED COUNT: 13.1 % (ref 11.5–14.5)
GLUCOSE SERPL-MCNC: 78 MG/DL (ref 74–99)
HCT VFR BLD AUTO: 47.5 % (ref 41–52)
HGB BLD-MCNC: 15.3 G/DL (ref 13.5–17.5)
MCH RBC QN AUTO: 29.4 PG (ref 26–34)
MCHC RBC AUTO-ENTMCNC: 32.2 G/DL (ref 32–36)
MCV RBC AUTO: 91 FL (ref 80–100)
NRBC BLD-RTO: 0 /100 WBCS (ref 0–0)
PLATELET # BLD AUTO: 305 X10*3/UL (ref 150–450)
POTASSIUM SERPL-SCNC: 4.4 MMOL/L (ref 3.5–5.3)
PROT SERPL-MCNC: 7.3 G/DL (ref 6.4–8.2)
RBC # BLD AUTO: 5.21 X10*6/UL (ref 4.5–5.9)
SODIUM SERPL-SCNC: 138 MMOL/L (ref 136–145)
WBC # BLD AUTO: 9.5 X10*3/UL (ref 4.4–11.3)

## 2024-11-26 PROCEDURE — 36415 COLL VENOUS BLD VENIPUNCTURE: CPT

## 2024-11-26 PROCEDURE — 85027 COMPLETE CBC AUTOMATED: CPT

## 2024-11-26 PROCEDURE — 80053 COMPREHEN METABOLIC PANEL: CPT

## 2024-12-03 ENCOUNTER — HOSPITAL ENCOUNTER (OUTPATIENT)
Dept: RADIOLOGY | Facility: HOSPITAL | Age: 69
Discharge: HOME | End: 2024-12-03
Payer: MEDICARE

## 2024-12-03 DIAGNOSIS — C65.1 MALIGNANT TUMOR OF RENAL PELVIS, RIGHT (MULTI): ICD-10-CM

## 2024-12-03 DIAGNOSIS — C67.9 MALIGNANT NEOPLASM OF URINARY BLADDER, UNSPECIFIED SITE (MULTI): ICD-10-CM

## 2024-12-03 PROCEDURE — 71250 CT THORAX DX C-: CPT | Performed by: RADIOLOGY

## 2024-12-03 PROCEDURE — 71250 CT THORAX DX C-: CPT

## 2024-12-03 PROCEDURE — 74178 CT ABD&PLV WO CNTR FLWD CNTR: CPT | Performed by: RADIOLOGY

## 2024-12-03 PROCEDURE — 76376 3D RENDER W/INTRP POSTPROCES: CPT | Performed by: RADIOLOGY

## 2024-12-03 PROCEDURE — 2550000001 HC RX 255 CONTRASTS: Performed by: STUDENT IN AN ORGANIZED HEALTH CARE EDUCATION/TRAINING PROGRAM

## 2024-12-03 PROCEDURE — 76377 3D RENDER W/INTRP POSTPROCES: CPT

## 2024-12-10 ENCOUNTER — LAB (OUTPATIENT)
Dept: LAB | Facility: HOSPITAL | Age: 69
End: 2024-12-10
Payer: MEDICARE

## 2024-12-10 DIAGNOSIS — C67.9 BLADDER CARCINOMA (MULTI): Primary | ICD-10-CM

## 2024-12-10 PROCEDURE — 36415 COLL VENOUS BLD VENIPUNCTURE: CPT

## 2024-12-10 NOTE — PROGRESS NOTES
"Subjective   Patient ID: Madan Marquez \"Carmelita" is a 69 y.o. male.      HPI  69 y.o. male presents for follow up status post open radical cystoprostatectomy and ileal conduit diversion 01/07/2022.    He was seen in the ED  in regards to a UTI and early detection sepsis on 9/3/2024. He was sent home with Ciprofloxacin 500 mg daily for 7 days.     He continues to take his Keflex 250 mg daily.     He has noticed that the urine located in the bag has foam present.     CT CHEST WO IV CONTRAST; 12/3/2024   IMPRESSION:  1. Emphysematous changes with bibasilar scarring.  2. redemonstration of mediastinal lymphadenopathy, without  significant change.  3. Prominent main pulmonary artery measuring up to 3.7 cm..  Correlation with history of pulmonary arterial hypertension is  suggested    CT UROGRAPHY WITH 3D VOLUME RENDERED IMAGING; 12/3/2024   IMPRESSION:  1. Extensive postoperative changes related to bladder excision and  ureteral loop ileostomy diversion. No obstruction or masses are seen.  No hydronephrosis or hydroureter.  2. Multiple low-attenuation lesions within bilateral kidneys  suggestive of cysts but some of them too small to fully characterize.  Correlation with ultrasound findings recommended.  3. Focal calcification of the left renal cortex similar to prior. No  enhancing renal masses are seen bilaterally.  4. Heterogeneous diffuse fatty infiltration of the liver similar to  prior. Correlation with liver function tests recommended.  5. Colonic diverticulosis but no CT evidence for acute diverticulitis.  6. Tiny 3 mm subpleural nodule within right lung base, stable since  prior accounting for difference in technique of imaging and slice  positioning. Attention in subsequent follow-up studies recommended.  7. Additional detailed nonacute findings as above.      Component      Latest Ref Rng 11/26/2024   GLUCOSE      74 - 99 mg/dL 78    SODIUM      136 - 145 mmol/L 138    POTASSIUM      3.5 - 5.3 mmol/L 4.4  "   CHLORIDE      98 - 107 mmol/L 101    Bicarbonate      21 - 32 mmol/L 28    Anion Gap      10 - 20 mmol/L 13    Blood Urea Nitrogen      6 - 23 mg/dL 19    Creatinine      0.50 - 1.30 mg/dL 1.23    Calcium      8.6 - 10.3 mg/dL 9.4    Albumin      3.4 - 5.0 g/dL 4.1    Alkaline Phosphatase      33 - 136 U/L 59    Total Protein      6.4 - 8.2 g/dL 7.3    AST      9 - 39 U/L 16    Bilirubin Total      0.0 - 1.2 mg/dL 0.6    ALT      10 - 52 U/L 24    EGFR      >60 mL/min/1.73m*2 63        Component      Latest Ref Rng 11/26/2024   WBC      4.4 - 11.3 x10*3/uL 9.5    nRBC      0.0 - 0.0 /100 WBCs 0.0    RBC      4.50 - 5.90 x10*6/uL 5.21    HEMOGLOBIN      13.5 - 17.5 g/dL 15.3    HEMATOCRIT      41.0 - 52.0 % 47.5    MCV      80 - 100 fL 91    MCHC      32.0 - 36.0 g/dL 32.2    Platelets      150 - 450 x10*3/uL 305    RED CELL DISTRIBUTION WIDTH      11.5 - 14.5 % 13.1    MCH      26.0 - 34.0 pg 29.4          Review of Systems  A complete review of systems was performed. All systems are noted to be negative unless indicated in the history of present illness, impression, active problem list, or past histories.     Objective   Physical Exam  NAD, alert  Mild additional protrusion of the urostomy, not concerning, no parastomal hernia felt, clear urine.     Assessment/Plan   Diagnoses and all orders for this visit:  Malignant neoplasm of urinary bladder, unspecified site (Multi)  -     CT urography w 3D volume rendered imaging; Future  -     CT chest wo IV contrast; Future  -     CBC; Future  -     Comprehensive Metabolic Panel; Future      69 y.o. male presents for follow up status post open radical cystoprostatectomy and ileal conduit diversion 01/07/2022.    His urine is looks unremarkable to me, when shown in clinic.     I personally reviewed the medical records of the patient including the lab values, the note of the referring physician and I reviewed the report and visualized the images performed focusing on  the  "CT urography. The report details kidney simple benign cysts and scaring from prior infections and \"extensive changes at the surgical site\" which to me translated as longer segment of the ostomy bulging beyond the fascia, no parastomal hernia. He has colonic diverticulosis, for which I advised him to lose weight, increase his fiber and hydration. The tiny nodule detailed on the report is not concerning as it has not grown.     He will continue to undergo scans q6 months. I advised him to return to office or proceed to the nearest emergency room if he is having symptoms of hematuria.     Will return in 6 months due to the patient going into his 2th year.     Plan:  CT Chest and CT Urogram completed prior to 6 month FUV  CBC, CMP and PSA completed prior to 6 month FUV  FUV in 6 months          Scribe Attestation   By signing my name below, Alexx MCKAY Scribe attestation that this documentation has been prepared under the direction and in the presence of Carlos Manuel Ward MD.   "

## 2024-12-11 ENCOUNTER — APPOINTMENT (OUTPATIENT)
Dept: UROLOGY | Facility: CLINIC | Age: 69
End: 2024-12-11
Payer: MEDICARE

## 2024-12-11 DIAGNOSIS — I10 ESSENTIAL (PRIMARY) HYPERTENSION: ICD-10-CM

## 2024-12-11 DIAGNOSIS — R60.0 LOCALIZED EDEMA: ICD-10-CM

## 2024-12-11 DIAGNOSIS — C67.9 MALIGNANT NEOPLASM OF URINARY BLADDER, UNSPECIFIED SITE (MULTI): ICD-10-CM

## 2024-12-11 PROCEDURE — 1157F ADVNC CARE PLAN IN RCRD: CPT | Performed by: STUDENT IN AN ORGANIZED HEALTH CARE EDUCATION/TRAINING PROGRAM

## 2024-12-11 PROCEDURE — 3048F LDL-C <100 MG/DL: CPT | Performed by: STUDENT IN AN ORGANIZED HEALTH CARE EDUCATION/TRAINING PROGRAM

## 2024-12-11 PROCEDURE — G2211 COMPLEX E/M VISIT ADD ON: HCPCS | Performed by: STUDENT IN AN ORGANIZED HEALTH CARE EDUCATION/TRAINING PROGRAM

## 2024-12-11 PROCEDURE — 99214 OFFICE O/P EST MOD 30 MIN: CPT | Performed by: STUDENT IN AN ORGANIZED HEALTH CARE EDUCATION/TRAINING PROGRAM

## 2024-12-11 PROCEDURE — 4010F ACE/ARB THERAPY RXD/TAKEN: CPT | Performed by: STUDENT IN AN ORGANIZED HEALTH CARE EDUCATION/TRAINING PROGRAM

## 2024-12-11 RX ORDER — HYDROCHLOROTHIAZIDE 12.5 MG/1
12.5 TABLET ORAL DAILY
Qty: 90 TABLET | Refills: 1 | Status: SHIPPED | OUTPATIENT
Start: 2024-12-11

## 2024-12-16 PROBLEM — A41.9 SEPSIS, DUE TO UNSPECIFIED ORGANISM, UNSPECIFIED WHETHER ACUTE ORGAN DYSFUNCTION PRESENT (MULTI): Status: RESOLVED | Noted: 2024-09-03 | Resolved: 2024-12-16

## 2024-12-16 RX ORDER — LANCETS 33 GAUGE
EACH MISCELLANEOUS
COMMUNITY
Start: 2024-10-13

## 2024-12-17 ENCOUNTER — APPOINTMENT (OUTPATIENT)
Dept: PRIMARY CARE | Facility: CLINIC | Age: 69
End: 2024-12-17
Payer: MEDICARE

## 2024-12-17 VITALS
BODY MASS INDEX: 35.7 KG/M2 | SYSTOLIC BLOOD PRESSURE: 124 MMHG | HEIGHT: 69 IN | WEIGHT: 241 LBS | HEART RATE: 78 BPM | DIASTOLIC BLOOD PRESSURE: 80 MMHG | OXYGEN SATURATION: 94 %

## 2024-12-17 DIAGNOSIS — Z12.11 ENCOUNTER FOR COLORECTAL CANCER SCREENING: ICD-10-CM

## 2024-12-17 DIAGNOSIS — J44.9 COPD, MILD (MULTI): ICD-10-CM

## 2024-12-17 DIAGNOSIS — Z93.6 PRESENCE OF UROSTOMY (MULTI): ICD-10-CM

## 2024-12-17 DIAGNOSIS — Z12.12 ENCOUNTER FOR COLORECTAL CANCER SCREENING: ICD-10-CM

## 2024-12-17 DIAGNOSIS — C67.9: ICD-10-CM

## 2024-12-17 DIAGNOSIS — E78.2 MIXED HYPERLIPIDEMIA: Primary | ICD-10-CM

## 2024-12-17 DIAGNOSIS — I10 ESSENTIAL (PRIMARY) HYPERTENSION: ICD-10-CM

## 2024-12-17 DIAGNOSIS — E11.65 TYPE 2 DIABETES MELLITUS WITH HYPERGLYCEMIA, WITHOUT LONG-TERM CURRENT USE OF INSULIN: ICD-10-CM

## 2024-12-17 DIAGNOSIS — R60.0 LOCALIZED EDEMA: ICD-10-CM

## 2024-12-17 DIAGNOSIS — I10 PRIMARY HYPERTENSION: ICD-10-CM

## 2024-12-17 PROBLEM — E87.5 HYPERKALEMIA: Status: RESOLVED | Noted: 2023-06-26 | Resolved: 2024-12-17

## 2024-12-17 PROBLEM — D49.2 GENERALIZED SKIN TUMORS: Status: RESOLVED | Noted: 2023-04-19 | Resolved: 2024-12-17

## 2024-12-17 LAB — POC HEMOGLOBIN A1C: 6.7 % (ref 4.2–6.5)

## 2024-12-17 PROCEDURE — 1160F RVW MEDS BY RX/DR IN RCRD: CPT | Performed by: FAMILY MEDICINE

## 2024-12-17 PROCEDURE — 1036F TOBACCO NON-USER: CPT | Performed by: FAMILY MEDICINE

## 2024-12-17 PROCEDURE — 3079F DIAST BP 80-89 MM HG: CPT | Performed by: FAMILY MEDICINE

## 2024-12-17 PROCEDURE — 83036 HEMOGLOBIN GLYCOSYLATED A1C: CPT | Performed by: FAMILY MEDICINE

## 2024-12-17 PROCEDURE — G2211 COMPLEX E/M VISIT ADD ON: HCPCS | Performed by: FAMILY MEDICINE

## 2024-12-17 PROCEDURE — 3074F SYST BP LT 130 MM HG: CPT | Performed by: FAMILY MEDICINE

## 2024-12-17 PROCEDURE — 99214 OFFICE O/P EST MOD 30 MIN: CPT | Performed by: FAMILY MEDICINE

## 2024-12-17 PROCEDURE — 4010F ACE/ARB THERAPY RXD/TAKEN: CPT | Performed by: FAMILY MEDICINE

## 2024-12-17 PROCEDURE — 3048F LDL-C <100 MG/DL: CPT | Performed by: FAMILY MEDICINE

## 2024-12-17 PROCEDURE — 3008F BODY MASS INDEX DOCD: CPT | Performed by: FAMILY MEDICINE

## 2024-12-17 PROCEDURE — 1159F MED LIST DOCD IN RCRD: CPT | Performed by: FAMILY MEDICINE

## 2024-12-17 PROCEDURE — 1157F ADVNC CARE PLAN IN RCRD: CPT | Performed by: FAMILY MEDICINE

## 2024-12-17 RX ORDER — HYDROCHLOROTHIAZIDE 12.5 MG/1
12.5 TABLET ORAL DAILY
Qty: 90 TABLET | Refills: 1 | Status: SHIPPED | OUTPATIENT
Start: 2024-12-17

## 2024-12-17 ASSESSMENT — ENCOUNTER SYMPTOMS: SHORTNESS OF BREATH: 0

## 2024-12-17 NOTE — PROGRESS NOTES
"Subjective   Patient ID: Kevin Marquez is a 69 y.o. male who presents for Follow-up. Pt was not able to afford the Januvia due to the cost. Blood sugars have been okay; only as high as 209 and on average they are 80 in the AM fasting.     Bladder resection status post bladder cancer  , tumor markers negative, up-to-date with heme-onc visits,  On prophylactic antibiotics     Diabetes mellitus: Not on medications , didn't take januvia   Elevated HgA1c : Watching sugars, diet has been a lot better, sugars have been a lot better AM 85-88,  post meal 162, 215   Reduced carbs and sugars   Some exercise       Hypertension  : Patient is taking blood pressure medications as directed.  < 140/  70s   Pt denies Chest Pain or Shortness of Breath      COPD: Stable, seeing pulmonology  Slight SHORTNESS OF BREATH ON EXERTION     Has not done a colonoscopy or stool card         Review of Systems   Respiratory:  Negative for shortness of breath.    Cardiovascular:  Negative for chest pain.       Objective   /80   Pulse 78   Ht 1.757 m (5' 9.17\")   Wt 109 kg (241 lb)   SpO2 94%   BMI 35.41 kg/m²     Physical Exam  Constitutional:       Appearance: Normal appearance. He is well-developed.   Cardiovascular:      Rate and Rhythm: Normal rate and regular rhythm.      Heart sounds: Normal heart sounds. No murmur heard.  Pulmonary:      Effort: Pulmonary effort is normal.      Breath sounds: Normal breath sounds.   Abdominal:      General: Abdomen is flat.      Palpations: Abdomen is soft.      Comments: Bladder stoma clean dry and intact no surrounding tenderness   Neurological:      General: No focal deficit present.      Mental Status: He is alert and oriented to person, place, and time.   Psychiatric:         Mood and Affect: Mood normal.         Behavior: Behavior normal.         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

## 2024-12-17 NOTE — PATIENT INSTRUCTIONS
Get your blood work as ordered.  You should hear from our office with results whether they are normal are not within a few days.  Please call the office if you do not hear from us.     There are several recommendations for colon cancer screening.  The new recommendations include screening at the age of 45 and above.  Colonoscopy generally is the best test for colon cancer screening.  This involves a procedure GI doctor looks at your colon through a camera.  The benefit of doing the colonoscopy as polyps can be removed during the colonoscopy to help prevent cancer.  This involves light sedation and requires cleaning out the colon before the procedure.  If you do not have any substantial polyps this is done every 10 years.  The other option for colon cancer screening is Cologuard stool tests.  These are almost as effective with regards to picking up colon cancer.  However, they do not  polyps so its not preventing cancer.  These are done every 3 years.  If you have a positive Cologuard test then you need to proceed with a colonoscopy.     Diabetes plan:   With diabetes it is recommended that you follow a low sugar low-carb  ADA diet.  Usually [id number] khalif per day as well as recommended.  Take your medications as directed.  If you have a glucometer and are on oral medications you should be checking your sugars several times per week.  If you are on insulin you should be taking your sugars several times per day.    Your goal should be to have fasting sugars levels : , 2 hrs post meal <170.  You need to see an eye doctor yearly to assess your retinas for diabetic changes yearly.  It is important to keep your weight under control and exercise regularly.  With diabetes you should be seen in the office every 3 months.

## 2024-12-20 DIAGNOSIS — I10 ESSENTIAL (PRIMARY) HYPERTENSION: ICD-10-CM

## 2024-12-20 RX ORDER — LOSARTAN POTASSIUM 100 MG/1
100 TABLET ORAL DAILY
Qty: 90 TABLET | Refills: 0 | Status: SHIPPED | OUTPATIENT
Start: 2024-12-20

## 2024-12-27 ENCOUNTER — APPOINTMENT (OUTPATIENT)
Dept: UROLOGY | Facility: CLINIC | Age: 69
End: 2024-12-27
Payer: MEDICARE

## 2024-12-31 LAB
COMMENTS - MP RESULT TYPE: NORMAL
SCAN RESULT: NORMAL

## 2025-01-08 DIAGNOSIS — Z93.6 OTHER ARTIFICIAL OPENINGS OF URINARY TRACT STATUS: ICD-10-CM

## 2025-01-08 RX ORDER — CEPHALEXIN 250 MG/1
250 CAPSULE ORAL DAILY
Qty: 90 CAPSULE | Refills: 1 | Status: SHIPPED | OUTPATIENT
Start: 2025-01-08

## 2025-01-10 ENCOUNTER — APPOINTMENT (OUTPATIENT)
Dept: UROLOGY | Facility: CLINIC | Age: 70
End: 2025-01-10
Payer: MEDICARE

## 2025-02-07 ENCOUNTER — HOSPITAL ENCOUNTER (OUTPATIENT)
Dept: RADIOLOGY | Facility: CLINIC | Age: 70
Discharge: HOME | End: 2025-02-07
Payer: MEDICARE

## 2025-02-07 ENCOUNTER — OFFICE VISIT (OUTPATIENT)
Dept: PRIMARY CARE | Facility: CLINIC | Age: 70
End: 2025-02-07
Payer: MEDICARE

## 2025-02-07 VITALS
HEART RATE: 116 BPM | TEMPERATURE: 98.7 F | BODY MASS INDEX: 35.7 KG/M2 | OXYGEN SATURATION: 90 % | HEIGHT: 69 IN | SYSTOLIC BLOOD PRESSURE: 151 MMHG | WEIGHT: 241 LBS | DIASTOLIC BLOOD PRESSURE: 75 MMHG

## 2025-02-07 DIAGNOSIS — R09.02 HYPOXIA: ICD-10-CM

## 2025-02-07 DIAGNOSIS — R50.9 FEVER, UNSPECIFIED FEVER CAUSE: ICD-10-CM

## 2025-02-07 DIAGNOSIS — J44.1 COPD EXACERBATION (MULTI): ICD-10-CM

## 2025-02-07 DIAGNOSIS — C67.9 BLADDER CARCINOMA (MULTI): ICD-10-CM

## 2025-02-07 DIAGNOSIS — N30.90 CYSTITIS: ICD-10-CM

## 2025-02-07 DIAGNOSIS — R05.1 ACUTE COUGH: Primary | ICD-10-CM

## 2025-02-07 DIAGNOSIS — E11.9 TYPE 2 DIABETES MELLITUS WITHOUT COMPLICATION, WITHOUT LONG-TERM CURRENT USE OF INSULIN (MULTI): ICD-10-CM

## 2025-02-07 DIAGNOSIS — R05.1 ACUTE COUGH: ICD-10-CM

## 2025-02-07 DIAGNOSIS — E66.01 OBESITY, MORBID (MULTI): ICD-10-CM

## 2025-02-07 DIAGNOSIS — Z93.6 PRESENCE OF UROSTOMY (MULTI): ICD-10-CM

## 2025-02-07 LAB
POC APPEARANCE, URINE: CLEAR
POC BILIRUBIN, URINE: NEGATIVE
POC BLOOD, URINE: ABNORMAL
POC COLOR, URINE: YELLOW
POC GLUCOSE, URINE: NEGATIVE MG/DL
POC KETONES, URINE: NEGATIVE MG/DL
POC LEUKOCYTES, URINE: ABNORMAL
POC NITRITE,URINE: NEGATIVE
POC PH, URINE: 6 PH
POC PROTEIN, URINE: ABNORMAL MG/DL
POC RAPID INFLUENZA A: NEGATIVE
POC RAPID INFLUENZA B: NEGATIVE
POC SARS-COV-2 AG BINAX: NORMAL
POC SPECIFIC GRAVITY, URINE: 1.02
POC UROBILINOGEN, URINE: 0.2 EU/DL

## 2025-02-07 PROCEDURE — 71046 X-RAY EXAM CHEST 2 VIEWS: CPT

## 2025-02-07 RX ORDER — ALBUTEROL SULFATE 90 UG/1
2 INHALANT RESPIRATORY (INHALATION) EVERY 4 HOURS PRN
Qty: 8 G | Refills: 1 | Status: SHIPPED | OUTPATIENT
Start: 2025-02-07 | End: 2026-02-07

## 2025-02-07 RX ORDER — CIPROFLOXACIN 500 MG/1
500 TABLET ORAL 2 TIMES DAILY
Qty: 20 TABLET | Refills: 0 | Status: SHIPPED | OUTPATIENT
Start: 2025-02-07 | End: 2025-02-17

## 2025-02-07 RX ORDER — ALBUTEROL SULFATE 1.25 MG/3ML
1.25 SOLUTION RESPIRATORY (INHALATION) ONCE
Status: COMPLETED | OUTPATIENT
Start: 2025-02-07 | End: 2025-02-07

## 2025-02-07 RX ORDER — METHYLPREDNISOLONE 4 MG/1
TABLET ORAL
Qty: 21 TABLET | Refills: 0 | Status: SHIPPED | OUTPATIENT
Start: 2025-02-07 | End: 2025-02-13

## 2025-02-07 RX ADMIN — ALBUTEROL SULFATE 1.25 MG: 1.25 SOLUTION RESPIRATORY (INHALATION) at 12:39

## 2025-02-07 ASSESSMENT — ENCOUNTER SYMPTOMS
FEVER: 1
SHORTNESS OF BREATH: 1
COUGH: 1

## 2025-02-07 NOTE — PATIENT INSTRUCTIONS
Fever, hypoxia: Concern over potential pneumonia, COPD exacerbation, cystitis  Patient has a history of getting septic quickly  So we will check some acute blood work and chest x-ray  Discussed with patient and his wife low threshold for having him go to the hospital if symptoms worsen    Get your blood work as ordered.  You should hear from our office with results whether they are normal are not within a few days.  Please call the office if you do not hear from us.     Will treat with antibiotics for presumptive pneumonia and cystitis    STAT bloodwork and Chest xray     You may take over-the-counter medications as needed for symptom relief.  Call the office if your symptoms worsen such as high fever and worsening cough or increase in symptoms.    Follow up if symptoms worsen or persist.     Any worsening symptoms, dizziness, shortness of breath, high fevers that don't resolve   Pulse ox < 85%   recommend ER evaluation    Check pulse ox 2-3 times per day     NO heavy lifting   discussed risk of tendon rupture with the quinolone and the steroid together but I feel this is the best option for coverage of long and urine for this patient  Can hold the Keflex while on the Cipro

## 2025-02-07 NOTE — PROGRESS NOTES
"Subjective   Patient ID: Kevin Marquez is a 69 y.o. male who presents for Cough and Fever onset yesterday but has been ill with a cough for a few weeks.     Cough over last day   Not feeling well  Fever 103 at home   Headache   Nasal congestion       Has a history of COPD, has seen pulmonology, was on oxygen in the past  Does have a pulse oximeter at home    Bladder cancer, has urostomy  On prophylactic antibiotics  No symptoms that way recently         Review of Systems   Constitutional:  Positive for fever.   HENT:  Positive for congestion.    Respiratory:  Positive for cough and shortness of breath.        Objective   /75   Pulse (!) 116   Temp 37.1 °C (98.7 °F)   Ht 1.757 m (5' 9.17\")   Wt 109 kg (241 lb)   SpO2 90%   BMI 35.41 kg/m²     Physical Exam  Constitutional:       Appearance: Normal appearance.   HENT:      Head: Normocephalic and atraumatic.      Right Ear: Tympanic membrane and ear canal normal.      Left Ear: Tympanic membrane and ear canal normal.      Nose: No nasal deformity.      Right Sinus: No maxillary sinus tenderness or frontal sinus tenderness.      Left Sinus: No maxillary sinus tenderness or frontal sinus tenderness.      Mouth/Throat:      Mouth: Mucous membranes are moist. No oral lesions.      Tongue: No lesions.      Pharynx: Oropharynx is clear.   Cardiovascular:      Rate and Rhythm: Normal rate and regular rhythm.   Pulmonary:      Effort: Pulmonary effort is normal.      Breath sounds: Normal breath sounds.      Comments:   Patient with hypoxia: Pulse ox was in the mid 80s    I did give him a nebulizer treatment while in the office  Air exchange was decreased, no rhonchi heard but some productive coughing with the nebulizer  Pulse ox did go down to 85% and then up to 90% after the nebulizer  Abdominal:      General: Abdomen is flat.      Palpations: Abdomen is soft.      Comments: Urostomy in place   Musculoskeletal:      Cervical back: No tenderness. "   Lymphadenopathy:      Cervical: No cervical adenopathy.   Neurological:      General: No focal deficit present.      Mental Status: He is alert and oriented to person, place, and time.   Psychiatric:         Mood and Affect: Mood normal.         Behavior: Behavior normal.         Assessment/Plan   Problem List Items Addressed This Visit             ICD-10-CM    Presence of urostomy (Multi) Z93.6    Bladder carcinoma (Multi) C67.9    Type 2 diabetes mellitus E11.9    Obesity, morbid (Multi) E66.01     Other Visit Diagnoses         Codes    Acute cough    -  Primary R05.1    Relevant Medications    albuterol 1.25 mg/3 mL nebulizer solution 1.25 mg (Completed)    Other Relevant Orders    POCT Influenza A/B manually resulted (Completed)    POCT UA Automated manually resulted (Completed)    POCT BinaxNOW Covid-19 Ag Card manually resulted (Completed)    CBC and Auto Differential    Comprehensive Metabolic Panel    XR chest 2 views (Completed)    Fever, unspecified fever cause     R50.9    Relevant Medications    albuterol 1.25 mg/3 mL nebulizer solution 1.25 mg (Completed)    ciprofloxacin (Cipro) 500 mg tablet    methylPREDNISolone (Medrol Dospak) 4 mg tablets    Other Relevant Orders    POCT UA Automated manually resulted (Completed)    POCT BinaxNOW Covid-19 Ag Card manually resulted (Completed)    Urine Culture    CBC and Auto Differential    Comprehensive Metabolic Panel    XR chest 2 views (Completed)    Hypoxia     R09.02    Relevant Medications    albuterol 1.25 mg/3 mL nebulizer solution 1.25 mg (Completed)    albuterol (Ventolin HFA) 90 mcg/actuation inhaler    ciprofloxacin (Cipro) 500 mg tablet    methylPREDNISolone (Medrol Dospak) 4 mg tablets    Other Relevant Orders    POCT UA Automated manually resulted (Completed)    POCT BinaxNOW Covid-19 Ag Card manually resulted (Completed)    CBC and Auto Differential    Comprehensive Metabolic Panel    XR chest 2 views (Completed)    Cystitis     N30.90     Relevant Medications    albuterol 1.25 mg/3 mL nebulizer solution 1.25 mg (Completed)    ciprofloxacin (Cipro) 500 mg tablet    methylPREDNISolone (Medrol Dospak) 4 mg tablets    Other Relevant Orders    POCT UA Automated manually resulted (Completed)    POCT BinaxNOW Covid-19 Ag Card manually resulted (Completed)    Urine Culture    CBC and Auto Differential    Comprehensive Metabolic Panel    XR chest 2 views (Completed)    COPD exacerbation (Multi)     J44.1

## 2025-02-10 LAB
ALBUMIN SERPL-MCNC: 4.5 G/DL (ref 3.6–5.1)
ALBUMIN/GLOB SERPL: 1.4 (CALC) (ref 1–2.5)
ALP SERPL-CCNC: 56 U/L (ref 35–144)
ALT SERPL-CCNC: 30 U/L (ref 9–46)
AST SERPL-CCNC: 21 U/L (ref 10–35)
BASOPHILS # BLD AUTO: 50 CELLS/UL (ref 0–200)
BASOPHILS NFR BLD AUTO: 0.5 %
BILIRUB SERPL-MCNC: 0.5 MG/DL (ref 0.2–1.2)
BUN SERPL-MCNC: 24 MG/DL (ref 7–25)
BUN/CREAT SERPL: 17 (CALC) (ref 6–22)
CALCIUM SERPL-MCNC: 9.9 MG/DL (ref 8.6–10.3)
CHLORIDE SERPL-SCNC: 101 MMOL/L (ref 98–110)
CO2 SERPL-SCNC: 26 MMOL/L (ref 20–32)
CREAT SERPL-MCNC: 1.45 MG/DL (ref 0.7–1.28)
EGFRCR SERPLBLD CKD-EPI 2021: 52 ML/MIN/1.73M2
EOSINOPHIL # BLD AUTO: 20 CELLS/UL (ref 15–500)
EOSINOPHIL NFR BLD AUTO: 0.2 %
ERYTHROCYTE [DISTWIDTH] IN BLOOD BY AUTOMATED COUNT: 13.2 % (ref 11–15)
GLOBULIN SER CALC-MCNC: 3.3 G/DL (CALC) (ref 1.9–3.7)
GLUCOSE SERPL-MCNC: 161 MG/DL (ref 65–139)
HCT VFR BLD AUTO: 49.2 % (ref 38.5–50)
HGB BLD-MCNC: 16 G/DL (ref 13.2–17.1)
LYMPHOCYTES # BLD AUTO: 760 CELLS/UL (ref 850–3900)
LYMPHOCYTES NFR BLD AUTO: 7.6 %
MCH RBC QN AUTO: 29 PG (ref 27–33)
MCHC RBC AUTO-ENTMCNC: 32.5 G/DL (ref 32–36)
MCV RBC AUTO: 89.1 FL (ref 80–100)
MONOCYTES # BLD AUTO: 930 CELLS/UL (ref 200–950)
MONOCYTES NFR BLD AUTO: 9.3 %
NEUTROPHILS # BLD AUTO: 8240 CELLS/UL (ref 1500–7800)
NEUTROPHILS NFR BLD AUTO: 82.4 %
PLATELET # BLD AUTO: 237 THOUSAND/UL (ref 140–400)
PMV BLD REES-ECKER: 9.8 FL (ref 7.5–12.5)
POTASSIUM SERPL-SCNC: 4.3 MMOL/L (ref 3.5–5.3)
PROT SERPL-MCNC: 7.8 G/DL (ref 6.1–8.1)
RBC # BLD AUTO: 5.52 MILLION/UL (ref 4.2–5.8)
SODIUM SERPL-SCNC: 138 MMOL/L (ref 135–146)
WBC # BLD AUTO: 10 THOUSAND/UL (ref 3.8–10.8)

## 2025-02-11 ENCOUNTER — TELEPHONE (OUTPATIENT)
Dept: PRIMARY CARE | Facility: CLINIC | Age: 70
End: 2025-02-11
Payer: MEDICARE

## 2025-02-11 NOTE — TELEPHONE ENCOUNTER
PT is having difficulty with O2 levels.  If he is moving at all it drops below 90 - has been as low as 79.  It goes up when sitting and resting.  They would like to see if Dr IBARRA was still able to contact Dr Tomlin and get O2 at home for the pt?

## 2025-02-11 NOTE — TELEPHONE ENCOUNTER
MD Myra Farr MA  Caller: Unspecified (Today, 12:41 PM)  I message Dr. Tomlin and he said he was going to try and get the patient and to set him up with O2  If his symptoms get any worse, increase shortness of breath or if his oxygen level does not improve to close to 90 at rest then go to the ER          Previous Messages    Follow-up (O2 ISSUES )  (Newest Message First)  View All Conversations on this Encounter   Beatrice Francis MD  You2 minutes ago (5:41 PM)       I message Dr. Tomlin and he said he was going to try and get the patient and to set him up with O2  If his symptoms get any worse, increase shortness of breath or if his oxygen level does not improve to close to 90 at rest then go to the ER      You routed conversation to Beatrice Francis MD4 hours ago (1:11 PM)      Nan Ruggiero routed conversation to [facility]5 hours ago (12:42 PM)     Nan Ruggiero5 hours ago (12:42 PM)     VG  PT is having difficulty with O2 levels.  If he is moving at all it drops below 90 - has been as low as 79.  It goes up when sitting and resting.  They would like to see if Dr IBARRA was still able to contact Dr Tomlin and get O2 at home for the pt?         Note        Anatoly Marquez [phone]  Nan Ruggiero5 hours ago (12:41 PM)   Spoke with pt's wife. [place]

## 2025-02-11 NOTE — RESULT ENCOUNTER NOTE
Blood count was normal which is good not a significant elevation white blood cell count  Sugar slightly elevated, a little dehydrated but not bad

## 2025-02-12 ENCOUNTER — OFFICE VISIT (OUTPATIENT)
Dept: PULMONOLOGY | Facility: CLINIC | Age: 70
End: 2025-02-12
Payer: MEDICARE

## 2025-02-12 VITALS
WEIGHT: 230 LBS | BODY MASS INDEX: 33.8 KG/M2 | SYSTOLIC BLOOD PRESSURE: 126 MMHG | HEART RATE: 71 BPM | RESPIRATION RATE: 16 BRPM | DIASTOLIC BLOOD PRESSURE: 74 MMHG | OXYGEN SATURATION: 95 %

## 2025-02-12 DIAGNOSIS — Z87.891 EX-CIGARETTE SMOKER: ICD-10-CM

## 2025-02-12 DIAGNOSIS — J96.91 RESPIRATORY FAILURE WITH HYPOXIA, UNSPECIFIED CHRONICITY (MULTI): Primary | ICD-10-CM

## 2025-02-12 DIAGNOSIS — J44.9 COPD, MILD (MULTI): ICD-10-CM

## 2025-02-12 DIAGNOSIS — R91.8 LUNG NODULES: ICD-10-CM

## 2025-02-12 LAB — BACTERIA UR CULT: ABNORMAL

## 2025-02-12 PROCEDURE — 1126F AMNT PAIN NOTED NONE PRSNT: CPT | Performed by: INTERNAL MEDICINE

## 2025-02-12 PROCEDURE — 1160F RVW MEDS BY RX/DR IN RCRD: CPT | Performed by: INTERNAL MEDICINE

## 2025-02-12 PROCEDURE — 3078F DIAST BP <80 MM HG: CPT | Performed by: INTERNAL MEDICINE

## 2025-02-12 PROCEDURE — 1036F TOBACCO NON-USER: CPT | Performed by: INTERNAL MEDICINE

## 2025-02-12 PROCEDURE — 3074F SYST BP LT 130 MM HG: CPT | Performed by: INTERNAL MEDICINE

## 2025-02-12 PROCEDURE — 4010F ACE/ARB THERAPY RXD/TAKEN: CPT | Performed by: INTERNAL MEDICINE

## 2025-02-12 PROCEDURE — 99214 OFFICE O/P EST MOD 30 MIN: CPT | Performed by: INTERNAL MEDICINE

## 2025-02-12 PROCEDURE — 1157F ADVNC CARE PLAN IN RCRD: CPT | Performed by: INTERNAL MEDICINE

## 2025-02-12 PROCEDURE — 1159F MED LIST DOCD IN RCRD: CPT | Performed by: INTERNAL MEDICINE

## 2025-02-12 RX ORDER — PREDNISONE 10 MG/1
TABLET ORAL
Qty: 30 TABLET | Refills: 0 | Status: SHIPPED | OUTPATIENT
Start: 2025-02-12 | End: 2025-03-14

## 2025-02-12 ASSESSMENT — ENCOUNTER SYMPTOMS
SHORTNESS OF BREATH: 1
WHEEZING: 1
PSYCHIATRIC NEGATIVE: 1
NEUROLOGICAL NEGATIVE: 1
CHILLS: 0
FEVER: 0
DEPRESSION: 0
COUGH: 1
GASTROINTESTINAL NEGATIVE: 1
CARDIOVASCULAR NEGATIVE: 1

## 2025-02-12 ASSESSMENT — PAIN SCALES - GENERAL: PAINLEVEL_OUTOF10: 0-NO PAIN

## 2025-02-12 ASSESSMENT — PATIENT HEALTH QUESTIONNAIRE - PHQ9
1. LITTLE INTEREST OR PLEASURE IN DOING THINGS: NOT AT ALL
SUM OF ALL RESPONSES TO PHQ9 QUESTIONS 1 AND 2: 0
2. FEELING DOWN, DEPRESSED OR HOPELESS: NOT AT ALL

## 2025-02-12 NOTE — ASSESSMENT & PLAN NOTE
mild obstruction 11/2022. Having COPD exacerbation.  Finished medrol rodolfo.  Will start 2 week prednisone course.  Restart Anoro.  Repeat PFT in April.

## 2025-02-12 NOTE — PROGRESS NOTES
"Subjective   Patient ID: Madan Marquez \"Carmelita" is a 69 y.o. male who presents for Shortness of Breath.  h/o COPD here for f/u. Mild obstruction (11/2022). Stable nodules on CT.  Had a URI last Friday.  Also UTI now on Cipro.  Has been profoundly hypoxic. Now needing O2.  No fevers, chills.  Has had significant cough w/ yellow sputum.  No myalgias, nausea,vomiting or diarrhea.  Had sinus symptoms that have since improved.  Used rescue a couple times.   ET 15 feet.    Shortness of Breath  Associated symptoms include wheezing. Pertinent negatives include no fever or rash.       Review of Systems   Constitutional:  Negative for chills and fever.   Respiratory:  Positive for cough, shortness of breath and wheezing.    Cardiovascular: Negative.    Gastrointestinal: Negative.    Skin:  Negative for rash.   Neurological: Negative.    Psychiatric/Behavioral: Negative.     All other systems reviewed and are negative.      Objective   Physical Exam  Vitals reviewed.   Constitutional:       Appearance: Normal appearance.   HENT:      Head: Normocephalic and atraumatic.   Eyes:      Extraocular Movements: Extraocular movements intact.   Cardiovascular:      Rate and Rhythm: Normal rate and regular rhythm.      Heart sounds: Normal heart sounds.   Pulmonary:      Effort: Pulmonary effort is normal.      Breath sounds: Wheezing present.   Abdominal:      Palpations: Abdomen is soft.      Tenderness: There is no abdominal tenderness.   Musculoskeletal:      Cervical back: Normal range of motion.   Skin:     General: Skin is warm.   Neurological:      General: No focal deficit present.      Mental Status: He is alert and oriented to person, place, and time. Mental status is at baseline.   Psychiatric:         Mood and Affect: Mood normal.         Behavior: Behavior normal.         Assessment/Plan   Problem List Items Addressed This Visit       COPD, mild (Multi)     mild obstruction 11/2022. Having COPD exacerbation.  " Finished medrol rodolfo.  Will start 2 week prednisone course.  Restart Anoro.  Repeat PFT in April.         Respiratory failure with hypoxia (Multi) - Primary     Pt required 5L on desat study today. Lowest O2 sat was 77%.  Will order O2 therapy for him         Lung nodules     Stable on recent CT.  Has Cts w/ urology every 6 months         Ex-cigarette smoker     Former smoker - 100 pack year history, quit in 2019. Cont lung Ca screening when pt is not getting routine CTs by oncology.  Cont until turns 76.          RTC in May    Time Spent  Prep time on day of patient encounter: 15 minutes  Time spent directly with patient, family or caregiver: 15 minutes  Additional Time Spent on Patient Care Activities: 0 minutes  Documentation Time: 5 minutes  Other Time Spent: 0 minutes  Total: 35 minutes        Franco Tomlin MD 02/12/25 3:53 PM

## 2025-03-13 ENCOUNTER — TELEPHONE (OUTPATIENT)
Dept: PRIMARY CARE | Facility: CLINIC | Age: 70
End: 2025-03-13
Payer: MEDICARE

## 2025-03-13 ENCOUNTER — LAB (OUTPATIENT)
Dept: LAB | Facility: HOSPITAL | Age: 70
End: 2025-03-13
Payer: MEDICARE

## 2025-03-13 ENCOUNTER — TELEPHONE (OUTPATIENT)
Dept: HEMATOLOGY/ONCOLOGY | Facility: CLINIC | Age: 70
End: 2025-03-13
Payer: MEDICARE

## 2025-03-13 LAB
ALBUMIN SERPL BCP-MCNC: 4 G/DL (ref 3.4–5)
ALP SERPL-CCNC: 55 U/L (ref 33–136)
ALT SERPL W P-5'-P-CCNC: 29 U/L (ref 10–52)
ANION GAP SERPL CALC-SCNC: 15 MMOL/L (ref 10–20)
AST SERPL W P-5'-P-CCNC: 15 U/L (ref 9–39)
BASOPHILS # BLD AUTO: 0.06 X10*3/UL (ref 0–0.1)
BASOPHILS NFR BLD AUTO: 0.7 %
BILIRUB SERPL-MCNC: 0.4 MG/DL (ref 0–1.2)
BUN SERPL-MCNC: 21 MG/DL (ref 6–23)
CALCIUM SERPL-MCNC: 9.2 MG/DL (ref 8.6–10.3)
CHLORIDE SERPL-SCNC: 104 MMOL/L (ref 98–107)
CO2 SERPL-SCNC: 24 MMOL/L (ref 21–32)
CREAT SERPL-MCNC: 1.23 MG/DL (ref 0.5–1.3)
EGFRCR SERPLBLD CKD-EPI 2021: 63 ML/MIN/1.73M*2
EOSINOPHIL # BLD AUTO: 0.34 X10*3/UL (ref 0–0.4)
EOSINOPHIL NFR BLD AUTO: 4.1 %
ERYTHROCYTE [DISTWIDTH] IN BLOOD BY AUTOMATED COUNT: 13.8 % (ref 11.5–14.5)
GLUCOSE SERPL-MCNC: 114 MG/DL (ref 74–99)
HCT VFR BLD AUTO: 43.9 % (ref 41–52)
HGB BLD-MCNC: 14.1 G/DL (ref 13.5–17.5)
IMM GRANULOCYTES # BLD AUTO: 0.11 X10*3/UL (ref 0–0.5)
IMM GRANULOCYTES NFR BLD AUTO: 1.3 % (ref 0–0.9)
LYMPHOCYTES # BLD AUTO: 2.15 X10*3/UL (ref 0.8–3)
LYMPHOCYTES NFR BLD AUTO: 26.1 %
MCH RBC QN AUTO: 28.6 PG (ref 26–34)
MCHC RBC AUTO-ENTMCNC: 32.1 G/DL (ref 32–36)
MCV RBC AUTO: 89 FL (ref 80–100)
MONOCYTES # BLD AUTO: 0.83 X10*3/UL (ref 0.05–0.8)
MONOCYTES NFR BLD AUTO: 10.1 %
NEUTROPHILS # BLD AUTO: 4.75 X10*3/UL (ref 1.6–5.5)
NEUTROPHILS NFR BLD AUTO: 57.7 %
PLATELET # BLD AUTO: 243 X10*3/UL (ref 150–450)
POTASSIUM SERPL-SCNC: 4.1 MMOL/L (ref 3.5–5.3)
PROT SERPL-MCNC: 7.3 G/DL (ref 6.4–8.2)
RBC # BLD AUTO: 4.93 X10*6/UL (ref 4.5–5.9)
SODIUM SERPL-SCNC: 139 MMOL/L (ref 136–145)
WBC # BLD AUTO: 8.2 X10*3/UL (ref 4.4–11.3)

## 2025-03-13 PROCEDURE — 84075 ASSAY ALKALINE PHOSPHATASE: CPT | Performed by: FAMILY MEDICINE

## 2025-03-13 PROCEDURE — 85025 COMPLETE CBC W/AUTO DIFF WBC: CPT | Performed by: FAMILY MEDICINE

## 2025-03-13 NOTE — TELEPHONE ENCOUNTER
Called and spoke to pts wife Anatoly who called regarding [facility] lab.  I notified her that orders were put in and that Kevin can stop at the [facility] lab anytime.  I also advised her to tell Kevin if he has issues with labs not being ordered to tell lab to speak to Dr Valencia's nurse to put in order while he is at the lab.  Patients wife Anatoly agreed to plan and verbalized understanding using teach back method.

## 2025-03-13 NOTE — TELEPHONE ENCOUNTER
Result Communication    Resulted Orders   POCT UA Automated manually resulted   Result Value Ref Range    POC Color, Urine Yellow Straw, Yellow, Light-Yellow    POC Appearance, Urine Clear Clear    POC Glucose, Urine NEGATIVE NEGATIVE mg/dl    POC Bilirubin, Urine NEGATIVE NEGATIVE    POC Ketones, Urine NEGATIVE NEGATIVE mg/dl    POC Specific Gravity, Urine 1.025 1.005 - 1.035    POC Blood, Urine LARGE (3+) (A) NEGATIVE    POC PH, Urine 6.0 No Reference Range Established PH    POC Protein, Urine >=300 (3+) (A) NEGATIVE mg/dl    POC Urobilinogen, Urine 0.2 0.2, 1.0 EU/DL    Poc Nitrite, Urine NEGATIVE NEGATIVE    POC Leukocytes, Urine TRACE (A) NEGATIVE   Urine Culture   Result Value Ref Range    CULTURE, URINE, ROUTINE SEE NOTE (A)       Comment:          CULTURE, URINE, ROUTINE         Micro Number:      [id number]    Test Status:       Final    Specimen Source:   Clean catch/voided    Specimen Quality:  Adequate    Result:            Greater than 100,000 CFU/mL of Enterobacter cloacae                              E.cloacae                            ----------------                            INT   KOLE     AMOX/CLAVULANATE       R     >16/8     AMPICILLIN             R     >16     AMP/SULBACTAM          R     >16/8     CEFAZOLIN              NR    >16 **1     CEFEPIME               S     8     CEFTAZIDIME            R     >16     CEFTAZ/AVIBACT         S     <4     CEFTOLOZANE-TAZO       S     <2     CEFTRIAXONE            R     >32     CEFUROXIME, SODIUM     R     >16     CIPROFLOXACIN          S     <0.25     ERTAPENEM              S     <0.5     GENTAMICIN             S     <2     IMIPENEM               S     <1     LEVOFLOXACIN           S     <0.5     MEROPENEM              S     <1     NITROFURANTOIN         I     64     PIP/T AZOBACTAM         I     32     TOBRAMYCIN             S     <2     TRIMETHOPRIM/SULFA     S     <0.5/9.5    S = Susceptible  I = Intermediate  R = Resistant  NS = Not susceptible  SDD =  Susceptible Dose Dependent  * = Not Tested  NR = Not Reported  **NN = See Therapy Comments      THERAPY COMMENTS        Note 1:      ORAL therapy: A cefazolin KOLE of <32 predicts      susceptibility to the oral agents cefaclor,      cefdinir, cefpodoxime, cefprozil, cefuroxime,      cephalexin, and loracarbef when used for therapy      of uncomplicated UTIs due to E. coli,      K. pneumoniae, and P. mirabilis.      PARENTERAL therapy: A cefazolin KOLE of >8      indicates resistance to parenteral cefazolin.     CBC and Auto Differential   Result Value Ref Range    WBC 8.2 4.4 - 11.3 x10*3/uL    RBC 4.93 4.50 - 5.90 x10*6/uL    Hemoglobin 14.1 13.5 - 17.5 g/dL    Hematocrit 43.9 41.0 - 52.0 %    MCV 89 80 - 100 fL    MCH 28.6 26.0 - 34.0 pg    MCHC 32.1 32.0 - 36.0 g/dL    RDW 13.8 11.5 - 14.5 %    Platelets 243 150 - 450 x10*3/uL    Neutrophils % 57.7 40.0 - 80.0 %    Immature Granulocytes %, Automated 1.3 (H) 0.0 - 0.9 %      Comment:      Immature Granulocyte Count (IG) includes promyelocytes, myelocytes and metamyelocytes but does not include bands. Percent differential counts (%) should be interpreted in the context of the absolute cell counts (cells/UL).    Lymphocytes % 26.1 13.0 - 44.0 %    Monocytes % 10.1 2.0 - 10.0 %    Eosinophils % 4.1 0.0 - 6.0 %    Basophils % 0.7 0.0 - 2.0 %    Neutrophils Absolute 4.75 1.60 - 5.50 x10*3/uL      Comment:      Percent differential counts (%) should be interpreted in the context of the absolute cell counts (cells/uL).    Immature Granulocytes Absolute, Automated 0.11 0.00 - 0.50 x10*3/uL    Lymphocytes Absolute 2.15 0.80 - 3.00 x10*3/uL    Monocytes Absolute 0.83 (H) 0.05 - 0.80 x10*3/uL    Eosinophils Absolute 0.34 0.00 - 0.40 x10*3/uL    Basophils Absolute 0.06 0.00 - 0.10 x10*3/uL   Comprehensive Metabolic Panel   Result Value Ref Range    Glucose 114 (H) 74 - 99 mg/dL    Sodium 139 136 - 145 mmol/L    Potassium 4.1 3.5 - 5.3 mmol/L    Chloride 104 98 - 107 mmol/L     Bicarbonate 24 21 - 32 mmol/L    Anion Gap 15 10 - 20 mmol/L    Urea Nitrogen 21 6 - 23 mg/dL    Creatinine 1.23 0.50 - 1.30 mg/dL    eGFR 63 >60 mL/min/1.73m*2      Comment:      Calculations of estimated GFR are performed using the 2021 CKD-EPI Study Refit equation without the race variable for the IDMS-Traceable creatinine methods.  https://jasn.asnjournals.org/content/early/2021/09/22/ASN.8196041167    Calcium 9.2 8.6 - 10.3 mg/dL    Albumin 4.0 3.4 - 5.0 g/dL    Alkaline Phosphatase 55 33 - 136 U/L    Total Protein 7.3 6.4 - 8.2 g/dL    AST 15 9 - 39 U/L    Bilirubin, Total 0.4 0.0 - 1.2 mg/dL    ALT 29 10 - 52 U/L      Comment:      Patients treated with Sulfasalazine may generate falsely decreased results for ALT.       5:31 PM  Beatrice Francis MD  P  [place] [id number] Clinical Support Staff  Labs are okay, sugar is okay if you were not fasting    Results were successfully communicated with the patient and they acknowledged their understanding.

## 2025-03-13 NOTE — TELEPHONE ENCOUNTER
----- Message from Beatrice Francis sent at 3/13/2025  1:03 PM EDT -----  Labs are okay, sugar is okay if you were not fasting

## 2025-03-17 ENCOUNTER — LAB (OUTPATIENT)
Dept: LAB | Facility: HOSPITAL | Age: 70
End: 2025-03-17
Payer: MEDICARE

## 2025-03-17 DIAGNOSIS — C67.9 BLADDER CARCINOMA: ICD-10-CM

## 2025-03-17 PROCEDURE — 36415 COLL VENOUS BLD VENIPUNCTURE: CPT

## 2025-03-20 ENCOUNTER — PATIENT OUTREACH (OUTPATIENT)
Dept: CARE COORDINATION | Facility: CLINIC | Age: 70
End: 2025-03-20
Payer: MEDICARE

## 2025-03-20 NOTE — PROGRESS NOTES
Left message regarding referral from Dr. Francis for DSME; name and contact info provided for retunr call

## 2025-03-25 ENCOUNTER — PATIENT OUTREACH (OUTPATIENT)
Dept: CARE COORDINATION | Facility: CLINIC | Age: 70
End: 2025-03-25
Payer: MEDICARE

## 2025-03-25 ENCOUNTER — PATIENT MESSAGE (OUTPATIENT)
Dept: CARE COORDINATION | Facility: CLINIC | Age: 70
End: 2025-03-25
Payer: MEDICARE

## 2025-03-25 NOTE — PROGRESS NOTES
2nd outreach call: left voicemail regarding referral from Dr. Fracnis for DSME. Name and contact info provided for return call.

## 2025-03-26 DIAGNOSIS — I10 ESSENTIAL (PRIMARY) HYPERTENSION: ICD-10-CM

## 2025-03-26 LAB
COMMENTS - MP RESULT TYPE: NORMAL
SCAN RESULT: NORMAL

## 2025-03-27 RX ORDER — LOSARTAN POTASSIUM 100 MG/1
100 TABLET ORAL DAILY
Qty: 90 TABLET | Refills: 0 | Status: SHIPPED | OUTPATIENT
Start: 2025-03-27

## 2025-03-30 ENCOUNTER — PATIENT OUTREACH (OUTPATIENT)
Dept: CARE COORDINATION | Facility: CLINIC | Age: 70
End: 2025-03-30
Payer: MEDICARE

## 2025-03-30 NOTE — PROGRESS NOTES
No response from patient from outreach attempts; will close referral; RD remains available as needed.

## 2025-04-18 ENCOUNTER — APPOINTMENT (OUTPATIENT)
Dept: CARDIOLOGY | Facility: HOSPITAL | Age: 70
End: 2025-04-18
Payer: MEDICARE

## 2025-04-18 ENCOUNTER — HOSPITAL ENCOUNTER (EMERGENCY)
Facility: HOSPITAL | Age: 70
Discharge: HOME | End: 2025-04-18
Payer: MEDICARE

## 2025-04-18 ENCOUNTER — APPOINTMENT (OUTPATIENT)
Dept: RADIOLOGY | Facility: HOSPITAL | Age: 70
End: 2025-04-18
Payer: MEDICARE

## 2025-04-18 ENCOUNTER — HOSPITAL ENCOUNTER (OUTPATIENT)
Dept: RESPIRATORY THERAPY | Facility: HOSPITAL | Age: 70
Discharge: HOME | End: 2025-04-18
Payer: MEDICARE

## 2025-04-18 VITALS
RESPIRATION RATE: 20 BRPM | HEART RATE: 89 BPM | SYSTOLIC BLOOD PRESSURE: 140 MMHG | DIASTOLIC BLOOD PRESSURE: 99 MMHG | OXYGEN SATURATION: 93 % | WEIGHT: 230 LBS | BODY MASS INDEX: 32.93 KG/M2 | HEIGHT: 70 IN | TEMPERATURE: 97.2 F

## 2025-04-18 DIAGNOSIS — J44.9 COPD, MILD (MULTI): ICD-10-CM

## 2025-04-18 DIAGNOSIS — R55 SYNCOPE AND COLLAPSE: Primary | ICD-10-CM

## 2025-04-18 DIAGNOSIS — R55 VASOVAGAL EPISODE: ICD-10-CM

## 2025-04-18 LAB
ALBUMIN SERPL BCP-MCNC: 4.3 G/DL (ref 3.4–5)
ALP SERPL-CCNC: 58 U/L (ref 33–136)
ALT SERPL W P-5'-P-CCNC: 25 U/L (ref 10–52)
ANION GAP SERPL CALC-SCNC: 13 MMOL/L (ref 10–20)
APPEARANCE UR: CLEAR
AST SERPL W P-5'-P-CCNC: 17 U/L (ref 9–39)
BASOPHILS # BLD AUTO: 0.07 X10*3/UL (ref 0–0.1)
BASOPHILS NFR BLD AUTO: 0.7 %
BILIRUB SERPL-MCNC: 0.6 MG/DL (ref 0–1.2)
BILIRUB UR STRIP.AUTO-MCNC: NEGATIVE MG/DL
BNP SERPL-MCNC: 13 PG/ML (ref 0–99)
BUN SERPL-MCNC: 22 MG/DL (ref 6–23)
CALCIUM SERPL-MCNC: 9.7 MG/DL (ref 8.6–10.3)
CARDIAC TROPONIN I PNL SERPL HS: 5 NG/L (ref 0–20)
CARDIAC TROPONIN I PNL SERPL HS: 6 NG/L (ref 0–20)
CHLORIDE SERPL-SCNC: 100 MMOL/L (ref 98–107)
CO2 SERPL-SCNC: 26 MMOL/L (ref 21–32)
COLOR UR: ABNORMAL
CREAT SERPL-MCNC: 1.17 MG/DL (ref 0.5–1.3)
D DIMER PPP FEU-MCNC: 454 NG/ML FEU
EGFRCR SERPLBLD CKD-EPI 2021: 67 ML/MIN/1.73M*2
EOSINOPHIL # BLD AUTO: 0.18 X10*3/UL (ref 0–0.4)
EOSINOPHIL NFR BLD AUTO: 1.8 %
ERYTHROCYTE [DISTWIDTH] IN BLOOD BY AUTOMATED COUNT: 15.5 % (ref 11.5–14.5)
GLUCOSE BLD MANUAL STRIP-MCNC: 208 MG/DL (ref 74–99)
GLUCOSE SERPL-MCNC: 182 MG/DL (ref 74–99)
GLUCOSE UR STRIP.AUTO-MCNC: NORMAL MG/DL
HCT VFR BLD AUTO: 45.9 % (ref 41–52)
HGB BLD-MCNC: 15.4 G/DL (ref 13.5–17.5)
HOLD SPECIMEN: NORMAL
IMM GRANULOCYTES # BLD AUTO: 0.05 X10*3/UL (ref 0–0.5)
IMM GRANULOCYTES NFR BLD AUTO: 0.5 % (ref 0–0.9)
KETONES UR STRIP.AUTO-MCNC: NEGATIVE MG/DL
LEUKOCYTE ESTERASE UR QL STRIP.AUTO: ABNORMAL
LYMPHOCYTES # BLD AUTO: 1.83 X10*3/UL (ref 0.8–3)
LYMPHOCYTES NFR BLD AUTO: 18.4 %
MAGNESIUM SERPL-MCNC: 1.63 MG/DL (ref 1.6–2.4)
MCH RBC QN AUTO: 30.9 PG (ref 26–34)
MCHC RBC AUTO-ENTMCNC: 33.6 G/DL (ref 32–36)
MCV RBC AUTO: 92 FL (ref 80–100)
MGC ASCENT PFT - FEV1 - PRE: 2.21
MGC ASCENT PFT - FEV1 - PREDICTED: 3.02
MGC ASCENT PFT - FVC - PRE: 3.37
MGC ASCENT PFT - FVC - PREDICTED: 4.01
MONOCYTES # BLD AUTO: 0.77 X10*3/UL (ref 0.05–0.8)
MONOCYTES NFR BLD AUTO: 7.8 %
NEUTROPHILS # BLD AUTO: 7.02 X10*3/UL (ref 1.6–5.5)
NEUTROPHILS NFR BLD AUTO: 70.8 %
NITRITE UR QL STRIP.AUTO: NEGATIVE
NRBC BLD-RTO: 0 /100 WBCS (ref 0–0)
PH UR STRIP.AUTO: 6 [PH]
PLATELET # BLD AUTO: 285 X10*3/UL (ref 150–450)
POTASSIUM SERPL-SCNC: 4.1 MMOL/L (ref 3.5–5.3)
PROT SERPL-MCNC: 7.8 G/DL (ref 6.4–8.2)
PROT UR STRIP.AUTO-MCNC: NEGATIVE MG/DL
RBC # BLD AUTO: 4.98 X10*6/UL (ref 4.5–5.9)
RBC # UR STRIP.AUTO: ABNORMAL MG/DL
RBC #/AREA URNS AUTO: ABNORMAL /HPF
SODIUM SERPL-SCNC: 135 MMOL/L (ref 136–145)
SP GR UR STRIP.AUTO: 1.01
UROBILINOGEN UR STRIP.AUTO-MCNC: NORMAL MG/DL
WBC # BLD AUTO: 9.9 X10*3/UL (ref 4.4–11.3)
WBC #/AREA URNS AUTO: ABNORMAL /HPF

## 2025-04-18 PROCEDURE — 84484 ASSAY OF TROPONIN QUANT: CPT | Performed by: NURSE PRACTITIONER

## 2025-04-18 PROCEDURE — 71046 X-RAY EXAM CHEST 2 VIEWS: CPT

## 2025-04-18 PROCEDURE — 94010 BREATHING CAPACITY TEST: CPT

## 2025-04-18 PROCEDURE — 85025 COMPLETE CBC W/AUTO DIFF WBC: CPT | Performed by: NURSE PRACTITIONER

## 2025-04-18 PROCEDURE — 85379 FIBRIN DEGRADATION QUANT: CPT | Performed by: NURSE PRACTITIONER

## 2025-04-18 PROCEDURE — 94726 PLETHYSMOGRAPHY LUNG VOLUMES: CPT

## 2025-04-18 PROCEDURE — 36415 COLL VENOUS BLD VENIPUNCTURE: CPT | Performed by: NURSE PRACTITIONER

## 2025-04-18 PROCEDURE — 70450 CT HEAD/BRAIN W/O DYE: CPT | Performed by: RADIOLOGY

## 2025-04-18 PROCEDURE — 87086 URINE CULTURE/COLONY COUNT: CPT | Mod: GEALAB | Performed by: NURSE PRACTITIONER

## 2025-04-18 PROCEDURE — 2500000004 HC RX 250 GENERAL PHARMACY W/ HCPCS (ALT 636 FOR OP/ED): Mod: JZ | Performed by: NURSE PRACTITIONER

## 2025-04-18 PROCEDURE — 81001 URINALYSIS AUTO W/SCOPE: CPT | Performed by: NURSE PRACTITIONER

## 2025-04-18 PROCEDURE — 80053 COMPREHEN METABOLIC PANEL: CPT | Performed by: NURSE PRACTITIONER

## 2025-04-18 PROCEDURE — 94760 N-INVAS EAR/PLS OXIMETRY 1: CPT

## 2025-04-18 PROCEDURE — 96365 THER/PROPH/DIAG IV INF INIT: CPT

## 2025-04-18 PROCEDURE — 99285 EMERGENCY DEPT VISIT HI MDM: CPT | Mod: 25

## 2025-04-18 PROCEDURE — 83735 ASSAY OF MAGNESIUM: CPT | Performed by: NURSE PRACTITIONER

## 2025-04-18 PROCEDURE — 82947 ASSAY GLUCOSE BLOOD QUANT: CPT

## 2025-04-18 PROCEDURE — 83880 ASSAY OF NATRIURETIC PEPTIDE: CPT | Performed by: NURSE PRACTITIONER

## 2025-04-18 PROCEDURE — 93005 ELECTROCARDIOGRAM TRACING: CPT

## 2025-04-18 PROCEDURE — 70450 CT HEAD/BRAIN W/O DYE: CPT

## 2025-04-18 RX ORDER — CEFTRIAXONE 1 G/50ML
1 INJECTION, SOLUTION INTRAVENOUS ONCE
Status: COMPLETED | OUTPATIENT
Start: 2025-04-18 | End: 2025-04-18

## 2025-04-18 RX ADMIN — CEFTRIAXONE 1 G: 1 INJECTION, SOLUTION INTRAVENOUS at 11:14

## 2025-04-18 ASSESSMENT — HEART SCORE
RISK FACTORS: >2 RISK FACTORS OR HX OF ATHEROSCLEROTIC DISEASE
TROPONIN: LESS THAN OR EQUAL TO NORMAL LIMIT
AGE: 65+
HISTORY: SLIGHTLY SUSPICIOUS
HEART SCORE: 4
ECG: NORMAL

## 2025-04-18 ASSESSMENT — LIFESTYLE VARIABLES
EVER HAD A DRINK FIRST THING IN THE MORNING TO STEADY YOUR NERVES TO GET RID OF A HANGOVER: NO
EVER FELT BAD OR GUILTY ABOUT YOUR DRINKING: NO
HAVE PEOPLE ANNOYED YOU BY CRITICIZING YOUR DRINKING: NO
HAVE YOU EVER FELT YOU SHOULD CUT DOWN ON YOUR DRINKING: NO
TOTAL SCORE: 0

## 2025-04-18 ASSESSMENT — COLUMBIA-SUICIDE SEVERITY RATING SCALE - C-SSRS
2. HAVE YOU ACTUALLY HAD ANY THOUGHTS OF KILLING YOURSELF?: NO
1. IN THE PAST MONTH, HAVE YOU WISHED YOU WERE DEAD OR WISHED YOU COULD GO TO SLEEP AND NOT WAKE UP?: NO
6. HAVE YOU EVER DONE ANYTHING, STARTED TO DO ANYTHING, OR PREPARED TO DO ANYTHING TO END YOUR LIFE?: NO

## 2025-04-18 ASSESSMENT — PAIN SCALES - GENERAL
PAINLEVEL_OUTOF10: 0 - NO PAIN
PAINLEVEL_OUTOF10: 0 - NO PAIN

## 2025-04-18 ASSESSMENT — PAIN - FUNCTIONAL ASSESSMENT: PAIN_FUNCTIONAL_ASSESSMENT: 0-10

## 2025-04-18 NOTE — ED PROVIDER NOTES
EMERGENCY DEPARTMENT ENCOUNTER      Pt Name: Madan Marquez  MRN: [id number]  Birthdate 1955  Date of evaluation: 4/18/2025  Provider: SELENE Freeman-CNP    CHIEF COMPLAINT       Chief Complaint   Patient presents with    shaking,diaphoretic     Pt was in pulmonary rehab and became diaphoretic, and shaky. States he doesn't remember what happened. Denies CP,SOB Endorses chills        HISTORY OF PRESENT ILLNESS    Madan Marquez is a 69 y.o. male who presents to the emergency department from pulmonary rehab for evaluation of syncopal episode.  Per report patient was having his PFTs performed and was getting his post test albuterol treatment when he became suddenly diaphoretic, pale, shaky and had a brief episode of unresponsiveness lasting approximately 10 seconds.  Patient is amnesic to the event.  His only complaint right now is chills.  He received no medications prior to arrival for his symptoms.  He states that he intermittently wears oxygen but not all the time.  He did eat this morning.  Respiratory therapy at bedside states that she has noted people to have syncopal episodes previously due to the vasovagal and while blowing into the machine.  She denies witnessing any seizure-like activity.  He denies any trauma, fall, injury, anticoagulation use, recent travel, large crowds, long periods of immobility, known sick contacts, smoking, drugs or alcohol.  He denies any history of blood clots.  He denies any headache, vision changes, dizziness, numbness, tingling, weakness, chest pain, cough, shortness of breath, nausea, vomiting, diarrhea, abdominal pain, urinary symptoms or any additional symptoms or complaints at this time.  He does have a chronic Chavez catheter in place for urinary retention and has had no issues with this.    Nursing Notes were reviewed.    History provided by patient.  No  used.    REVIEW OF SYSTEMS     ROS is otherwise negative unless stated  above.    PAST MEDICAL HISTORY   Medical History[1]    SURGICAL HISTORY     Surgical History[2]    ALLERGIES     Codeine and Midazolam    FAMILY HISTORY     Family History[3]     SOCIAL HISTORY     Social History[4]    PHYSICAL EXAM   VS: As documented in the triage note and EMR flowsheet from this visit were reviewed.    GEN: NAD, nontoxic, well-appearing, resting comfortably in the ER cart without difficulty or dyspnea  EYES:  EOMs grossly intact, anicteric sclera, no nystagmus noted, clear and equal bilaterally, no foreign body noted  HEENT: Airway patent  CARD: RRR, nontender chest, no crepitus deformities, no JVD, no murmurs rubs or gallops ; No edema noted.  Positive pulses bilaterally throughout.  Capillary refill less than 3 seconds.  No abnormal redness, warmth, tenderness or swelling noted to bilateral lower extremities.  PULMONARY: Clear all lung fields. Moving air well, Nonlabored, no accessory muscle use, able to speak complete sentences  ABDOMEN: Abdomen soft, distended, no rebound, no guarding. Bowel sounds normal in all 4 quadrants. No tenderness to palpation.  No CVA tenderness.  No masses or organomegaly noted.  No evidence of peritonitis. Negative Metz's and McBurney's point tenderness.    : deferred  MUSK: Spine appears normal, range of motion is not limited, no muscle or joint tenderness. Strength 5 out of 5 equal bilaterally throughout.  No step-offs, deformities or additional signs of trauma noted.  No spinal/midline tenderness to palpation  SKIN: Skin normal color for race, warm, dry and intact. No evidence of trauma. No rash noted.  NEURO: Alert and oriented x 3, speech is clear, no obvious deficits noted. No facial droop noted.  GCS 15  PSYCH: Alert and oriented to person, place, time/situation. normal mood and affect. No apparent risk to self or others. Thoughts are linear.  Does not appear decompensated.  Does not appear internally stimulated.  LYMPH: No adenopathy or splenomegaly. No  cervical, supraclavicular or inguinal lymphadenopathy.    DIAGNOSTIC RESULTS   RADIOLOGY:   Non-plain film images such as CT, Ultrasound and MRI are read by the radiologist. Plain radiographic images are visualized and preliminarily interpreted by myself with the below findings: Chest x-ray which revealed no acute cardiopulmonary process, CT head which revealed no acute intracranial process      Interpretation per the Radiologist below, if available at the time of this note:    XR chest 2 views   Final Result   1.  No evidence of acute cardiopulmonary process.                  MACRO:   None        Signed by: Gracie Garcia 4/18/2025 9:50 AM   Dictation workstation:   [id number]      CT head wo IV contrast   Final Result   Mild age related degenerative change as described without acute   findings or significant change from the prior exam.        Signed by: Dylan Dodd 4/18/2025 9:41 AM   Dictation workstation:   [id number]            ED BEDSIDE ULTRASOUND:   Performed by myself - none    LABS:  Labs Reviewed   CBC WITH AUTO DIFFERENTIAL - Abnormal       Result Value    WBC 9.9      nRBC 0.0      RBC 4.98      Hemoglobin 15.4      Hematocrit 45.9      MCV 92      MCH 30.9      MCHC 33.6      RDW 15.5 (*)     Platelets 285      Neutrophils % 70.8      Immature Granulocytes %, Automated 0.5      Lymphocytes % 18.4      Monocytes % 7.8      Eosinophils % 1.8      Basophils % 0.7      Neutrophils Absolute 7.02 (*)     Immature Granulocytes Absolute, Automated 0.05      Lymphocytes Absolute 1.83      Monocytes Absolute 0.77      Eosinophils Absolute 0.18      Basophils Absolute 0.07     COMPREHENSIVE METABOLIC PANEL - Abnormal    Glucose 182 (*)     Sodium 135 (*)     Potassium 4.1      Chloride 100      Bicarbonate 26      Anion Gap 13      Urea Nitrogen 22      Creatinine 1.17      eGFR 67      Calcium 9.7      Albumin 4.3      Alkaline Phosphatase 58      Total Protein 7.8      AST 17      Bilirubin, Total 0.6       ALT 25     URINALYSIS WITH REFLEX CULTURE AND MICROSCOPIC - Abnormal    Color, Urine Light-Yellow      Appearance, Urine Clear      Specific Gravity, Urine 1.012      pH, Urine 6.0      Protein, Urine NEGATIVE      Glucose, Urine Normal      Blood, Urine 0.03 (TRACE) (*)     Ketones, Urine NEGATIVE      Bilirubin, Urine NEGATIVE      Urobilinogen, Urine Normal      Nitrite, Urine NEGATIVE      Leukocyte Esterase, Urine 25 Vera/uL (*)    MICROSCOPIC ONLY, URINE - Abnormal    WBC, Urine 6-10 (*)     RBC, Urine 3-5     POCT GLUCOSE - Abnormal    POCT Glucose 208 (*)    MAGNESIUM - Normal    Magnesium 1.63     B-TYPE NATRIURETIC PEPTIDE - Normal    BNP 13      Narrative:        <100 pg/mL - Heart failure unlikely  100-299 pg/mL - Intermediate probability of acute heart                  failure exacerbation. Correlate with clinical                  context and patient history.    >=300 pg/mL - Heart Failure likely. Correlate with clinical                  context and patient history.    BNP testing is performed using different testing methodology at [facility] than at other [facility]. Direct result comparisons should only be made within the same method.      D-DIMER, VTE EXCLUSION - Normal    D-Dimer, Quantitative VTE Exclusion 454      Narrative:     The VTE Exclusion D-Dimer assay is reported in ng/mL Fibrinogen Equivalent Units (FEU).    Per 's instructions for use, a value of less than 500 ng/mL (FEU) may help to exclude DVT or PE in outpatients when the assay is used with a clinical pretest probability assessment.([facility] must utilize and document eCalc 'Wells Score Deep Vein Thrombosis Risk' for DVT exclusion only. Emergency Department should utilize  Guidelines for Emergency Department Use of the VTE Exclusion D-Dimer and Clinical Pretest probability assessment model for DVT or PE exclusion.)   SERIAL TROPONIN-INITIAL - Normal    Troponin I, High Sensitivity 6      Narrative:      Less than 99th percentile of normal range cutoff-  Female and children under 18 years old <14 ng/L; Male <21 ng/L: Negative  Repeat testing should be performed if clinically indicated.     Female and children under 18 years old 14-50 ng/L; Male 21-50 ng/L:  Consistent with possible cardiac damage and possible increased clinical   risk. Serial measurements may help to assess extent of myocardial damage.     >50 ng/L: Consistent with cardiac damage, increased clinical risk and  myocardial infarction. Serial measurements may help assess extent of   myocardial damage.      NOTE: Children less than 1 year old may have higher baseline troponin   levels and results should be interpreted in conjunction with the overall   clinical context.     NOTE: Troponin I testing is performed using a different   testing methodology at [facility] than at other   [facility]. Direct result comparisons should only   be made within the same method.   SERIAL TROPONIN, 1 HOUR - Normal    Troponin I, High Sensitivity 5      Narrative:     Less than 99th percentile of normal range cutoff-  Female and children under 18 years old <14 ng/L; Male <21 ng/L: Negative  Repeat testing should be performed if clinically indicated.     Female and children under 18 years old 14-50 ng/L; Male 21-50 ng/L:  Consistent with possible cardiac damage and possible increased clinical   risk. Serial measurements may help to assess extent of myocardial damage.     >50 ng/L: Consistent with cardiac damage, increased clinical risk and  myocardial infarction. Serial measurements may help assess extent of   myocardial damage.      NOTE: Children less than 1 year old may have higher baseline troponin   levels and results should be interpreted in conjunction with the overall   clinical context.     NOTE: Troponin I testing is performed using a different   testing methodology at [facility] than at other   [facility]. Direct result  "comparisons should only   be made within the same method.   URINE CULTURE   TROPONIN SERIES- (INITIAL, 1 HR)    Narrative:     The following orders were created for panel order Troponin I Series, High Sensitivity (0, 1 HR).  Procedure                               Abnormality         Status                     ---------                               -----------         ------                     Troponin I, High Sensiti...[[id number]]  Normal              Final result               Troponin, High Sensitivi...[[id number]]  Normal              Final result                 Please view results for these tests on the individual orders.   URINALYSIS WITH REFLEX CULTURE AND MICROSCOPIC    Narrative:     The following orders were created for panel order Urinalysis with Reflex Culture and Microscopic.  Procedure                               Abnormality         Status                     ---------                               -----------         ------                     Urinalysis with Reflex C...[[id number]]  Abnormal            Final result               Extra Urine Gray Tube[[id number]]                            In process                   Please view results for these tests on the individual orders.   EXTRA URINE GRAY TUBE       All other labs were within normal range or not returned as of this dictation.    EMERGENCY DEPARTMENT COURSE/MDM:   Vitals:    Vitals:    04/18/25 0913   BP: (!) 140/99   BP Location: Right arm   Patient Position: Lying   Pulse: 89   Resp: 20   Temp: 36.2 °C (97.2 °F)   TempSrc: Skin   SpO2: (!) 93%   Weight: 104 kg (230 lb)   Height: 1.778 m (5' 10\")       I reviewed the patient's triage vitals.    This is a 69-year-old male who presents to the ED for evaluation after syncopal episode.  He is sitting comfortably in the ER cart without difficulty or dyspnea.  He is placed on continuous cardiac monitor and pulse oximetry.  He does appear to have chills but assessment is otherwise benign.  He " declined need for medications at this time.    Workup was reviewed and unremarkable.  Heart score is 4 due to his age and comorbidities but I do not believe that this was ACS at this time.  He states that he just had an echocardiogram performed which was unremarkable.  I did note his urinalysis result and give a dose of Rocephin IV here in the ER, was going to initiate him on antibiotics but he states he is already on daily antibiotics for prophylaxis and as he is asymptomatic he did not want to be initiated on any additional medications at this time and I comfortable with this.  He is educated signs symptoms of worsening infection.  Upon reevaluation he feels improved and reassured.  He is tolerating p.o. without difficulty, asymptomatic and ambulating independently.  I do believe the patient had a vasovagal episode during his PFTs.  He remained hemodynamically stable without any additional episodes throughout my ED course of care.  He is educated on symptomatic and supportive care at home including proper p.o., proper hydration and changing positions slowly and prevention of bearing down.    Diagnoses as of 04/18/25 1201   Syncope and collapse   Vasovagal episode       Patient was counseled regarding labs, imaging, likely diagnosis, and plan. All questions were answered.     ------------------------------------------------------------------  EKG Interpretation: Normal sinus rhythm at a rate of 96, , , QTc 497 without evidence of STEMI or ischemia    Differential Diagnoses Considered: Syncopal episode, ACS, NSTEMI, vasovagal episode, seizure, hypoglycemia, electrolyte abnormality, PE    Chronic Medical Conditions Significantly Affecting Care: None    External Records Reviewed: I reviewed recent and relevant outside records including: PCP notes, prior discharge summary, previous radiologic studies, HIE    Escalation of Care:  Appropriate for outpatient management with primary care provider follow-up in  the next week for reevaluation.  Return precautions discussed and patient verbalized understanding. All questions and concerns were addressed.    Social Determinants of Health Significantly Affecting Care:  None    Prescription Drug Consideration: None indicated this time    Diagnostic testing considered: Considered a CT PE but D-dimer is negative, no occasion for exacerbation at this time and he has an otherwise reassuring assessment    Discussion of Management with Other Providers:   I discussed the patient/results with: None      ------------------------------------------------------------------  ED Medications administered this visit:    Medications   cefTRIAXone (Rocephin) 1 g in dextrose (iso) IV 50 mL (1 g intravenous New Bag 4/18/25 1114)       New Prescriptions from this visit:    New Prescriptions    No medications on file       Follow-up:  Beatrice Francis MD  [address] [address] [address]  [phone]    Schedule an appointment as soon as possible for a visit in 3 days  reevaluation        Final Impression:   1. Syncope and collapse    2. Vasovagal episode          Yvonne Olivera, APRN-CNP        (Please note that portions of this note were completed with a voice recognition program.  Efforts were made to edit the dictations but occasionally words are mis-transcribed.)         [1]   Past Medical History:  Diagnosis Date    Abdominal distension (gaseous) 05/08/2019    Abdominal distension    Acute lower UTI 04/19/2023    Acute maxillary sinusitis 04/19/2023    Acute postoperative pain 01/17/2024    Bacteremia 10/12/2022    Body mass index (BMI) 35.0-35.9, adult 04/07/2021    BMI 35.0-35.9,adult    Contact with and (suspected) exposure to covid-19 10/27/2022    Diverticulitis 04/23/2024    Dysfunction of eustachian tube 01/17/2024    Elevated blood-pressure reading, without diagnosis of hypertension 11/11/2016    Elevated blood pressure reading without diagnosis of hypertension     Hematuria 04/19/2023    Hypoxia 01/17/2024    Ingrowing nail 08/27/2017    Ingrown toenail    Injury of kidney 10/27/2022    Leukocytosis 04/04/2023    Localized edema 03/15/2019    Edema extremities    Morbid (severe) obesity due to excess calories (Multi) 04/07/2021    Severe obesity (BMI 35.0-39.9) with comorbidity    Other chest pain 05/17/2019    Chest pressure    Other transient cerebral ischemic attacks and related syndromes     Acute anterior circulation transient ischemic attack    Personal history of diseases of the blood and blood-forming organs and certain disorders involving the immune mechanism 05/17/2019    History of leukocytosis    Personal history of other diseases of the nervous system and sense organs 01/13/2020    History of eustachian tube dysfunction    Personal history of other diseases of the respiratory system 11/06/2017    History of allergic rhinitis    Personal history of other diseases of the respiratory system 06/25/2019    History of acute sinusitis    Personal history of other diseases of the respiratory system 02/08/2016    History of sinusitis    Personal history of other endocrine, nutritional and metabolic disease 10/06/2021    History of type 2 diabetes mellitus    Personal history of other specified conditions 01/22/2019    History of weight gain    Personal history of other specified conditions 08/15/2018    History of lymphadenopathy    Personal history of transient ischemic attack (TIA), and cerebral infarction without residual deficits 05/05/2017    History of transient ischemic attack    Septic shock (Multi) 01/17/2024    Unspecified symptoms and signs involving the genitourinary system 05/09/2019    Urinary symptom or sign   [2]   Past Surgical History:  Procedure Laterality Date    OTHER SURGICAL HISTORY  11/06/2017    Chemosurgery (Mohs Micrographic Technique)    OTHER SURGICAL HISTORY  09/19/2022    Radical cystoprostatectomy    TONSILLECTOMY  11/06/2017     Tonsillectomy   [3]   Family History  Problem Relation Name Age of Onset    GI problems Mother      Heart failure Mother      Heart disease Father      Heart attack Father     [4]   Social History  Socioeconomic History    Marital status:    Tobacco Use    Smoking status: Former     Current packs/day: 2.00     Types: Cigarettes    Smokeless tobacco: Never   Vaping Use    Vaping status: Never Used   Substance and Sexual Activity    Alcohol use: Not Currently    Drug use: Never     Social Drivers of Health     Financial Resource Strain: Low Risk  (9/4/2024)    Overall Financial Resource Strain (CARDIA)     Difficulty of Paying Living Expenses: Not hard at all   Food Insecurity: No Food Insecurity (10/18/2024)    Hunger Vital Sign     Worried About Running Out of Food in the Last Year: Never true     Ran Out of Food in the Last Year: Never true   Transportation Needs: No Transportation Needs (9/4/2024)    PRAPARE - Transportation     Lack of Transportation (Medical): No     Lack of Transportation (Non-Medical): No   Housing Stability: Low Risk  (9/4/2024)    Housing Stability Vital Sign     Unable to Pay for Housing in the Last Year: No     Number of Times Moved in the Last Year: 1     Homeless in the Last Year: No        Yvonne Olivera, SELENE-CNP  04/18/25 4073

## 2025-04-18 NOTE — SIGNIFICANT EVENT
Patient presented for routine pulmonary function testing. Patient was able to complete approximately 50% of the test but began experiencing difficulty midway through. Prior to administration of Albuterol nebulizer, patient became diaphoretic, shaky, and noticeable difficulty speaking. Rapid Response Team was called. Patient placed on non -rebreather mask. Pulse oximetry was 94%. Rapid Response Team arrived and patient was transported to the Emergency Department for further evaluation and treatment.

## 2025-04-18 NOTE — ED TRIAGE NOTES
Pt was in pulmonary rehab and became diaphoretic, and shaky. States he doesn't remember what happened. Denies CP,SOB Endorses chills

## 2025-04-20 LAB — BACTERIA UR CULT: ABNORMAL

## 2025-04-22 ENCOUNTER — TELEPHONE (OUTPATIENT)
Dept: PRIMARY CARE | Facility: CLINIC | Age: 70
End: 2025-04-22
Payer: MEDICARE

## 2025-04-22 ENCOUNTER — OFFICE VISIT (OUTPATIENT)
Dept: HEMATOLOGY/ONCOLOGY | Facility: CLINIC | Age: 70
End: 2025-04-22
Payer: MEDICARE

## 2025-04-22 ENCOUNTER — TELEPHONE (OUTPATIENT)
Dept: PHARMACY | Facility: HOSPITAL | Age: 70
End: 2025-04-22
Payer: MEDICARE

## 2025-04-22 VITALS
TEMPERATURE: 97.3 F | OXYGEN SATURATION: 93 % | SYSTOLIC BLOOD PRESSURE: 144 MMHG | RESPIRATION RATE: 16 BRPM | BODY MASS INDEX: 35.84 KG/M2 | HEART RATE: 68 BPM | DIASTOLIC BLOOD PRESSURE: 82 MMHG | WEIGHT: 249.78 LBS

## 2025-04-22 DIAGNOSIS — C67.9 BLADDER CARCINOMA: ICD-10-CM

## 2025-04-22 DIAGNOSIS — N30.00 ACUTE CYSTITIS WITHOUT HEMATURIA: Primary | ICD-10-CM

## 2025-04-22 LAB
ATRIAL RATE: 96 BPM
P AXIS: 47 DEGREES
P OFFSET: 169 MS
P ONSET: 120 MS
PR INTERVAL: 182 MS
Q ONSET: 211 MS
QRS COUNT: 16 BEATS
QRS DURATION: 122 MS
QT INTERVAL: 394 MS
QTC CALCULATION(BAZETT): 497 MS
QTC FREDERICIA: 460 MS
R AXIS: -73 DEGREES
T AXIS: 68 DEGREES
T OFFSET: 408 MS
VENTRICULAR RATE: 96 BPM

## 2025-04-22 PROCEDURE — 3079F DIAST BP 80-89 MM HG: CPT | Performed by: INTERNAL MEDICINE

## 2025-04-22 PROCEDURE — 1126F AMNT PAIN NOTED NONE PRSNT: CPT | Performed by: INTERNAL MEDICINE

## 2025-04-22 PROCEDURE — 1157F ADVNC CARE PLAN IN RCRD: CPT | Performed by: INTERNAL MEDICINE

## 2025-04-22 PROCEDURE — 99214 OFFICE O/P EST MOD 30 MIN: CPT | Performed by: INTERNAL MEDICINE

## 2025-04-22 PROCEDURE — 3077F SYST BP >= 140 MM HG: CPT | Performed by: INTERNAL MEDICINE

## 2025-04-22 PROCEDURE — 4010F ACE/ARB THERAPY RXD/TAKEN: CPT | Performed by: INTERNAL MEDICINE

## 2025-04-22 RX ORDER — CIPROFLOXACIN 250 MG/1
250 TABLET, FILM COATED ORAL 2 TIMES DAILY
Qty: 10 TABLET | Refills: 0 | Status: SHIPPED | OUTPATIENT
Start: 2025-04-22 | End: 2025-04-27

## 2025-04-22 RX ORDER — CEPHALEXIN 250 MG/1
250 CAPSULE ORAL DAILY
COMMUNITY

## 2025-04-22 ASSESSMENT — PAIN SCALES - GENERAL: PAINLEVEL_OUTOF10: 0-NO PAIN

## 2025-04-22 ASSESSMENT — ENCOUNTER SYMPTOMS
CARDIOVASCULAR NEGATIVE: 1
RESPIRATORY NEGATIVE: 1
GASTROINTESTINAL NEGATIVE: 1
CONSTITUTIONAL NEGATIVE: 1

## 2025-04-22 NOTE — PATIENT INSTRUCTIONS
Today you met with your hematologist/oncologist.  Recent labs were discussed and questions answered.  Scheduling orders were placed.  While we appreciate that you verbalized understanding, if any questions arise after leaving, please do not hesitate to call the office to discuss.  [phone] Umer Poon.    Dr Valencia will see you in one year. That appointment has been made for you. Please continue to have a Signatera drawn every 3 months.

## 2025-04-22 NOTE — TELEPHONE ENCOUNTER
Wife calling very upset b/c a pharmacist from  called her regarding her husbands urine culture that was taken at the ER. It came back pos for a bacteria that is not susceptible to Keflex so they called in a script for Cipro. She states she doesn't believe this appropriate and wants [facility] to override what the ER said and what the pharmacist is calling her about.  She doesn't understand why they would want to change his antibiotic. She wants a call back.

## 2025-04-22 NOTE — PROGRESS NOTES
Patient ID: Kevin Marquez is a 69 y.o. male.  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 History of BCG for  non muscle invasive bladder cancer.  CT in follow up showed only bladder wall thickening which is unchanged.  Signatera has been negative.  When last seen he was septic and sent to ER for urosepsis.  CT at that time 10/10/22 was negative for metastatic  disease.     Review of Systems   Constitutional: Negative.    HENT:  Negative.     Respiratory: Negative.     Cardiovascular: Negative.    Gastrointestinal: Negative.         Objective   BSA: 2.36 meters squared  /82 (BP Location: Left arm)   Pulse 68   Temp 36.3 °C (97.3 °F)   Resp 16   Wt 113 kg (249 lb 12.5 oz)   SpO2 93%   BMI 35.84 kg/m²      has a past medical history of Abdominal distension (gaseous) (05/08/2019), Acute lower UTI (04/19/2023), Acute maxillary sinusitis (04/19/2023), Acute postoperative pain (01/17/2024), Bacteremia (10/12/2022), Body mass index (BMI) 35.0-35.9, adult (04/07/2021), Contact with and (suspected) exposure to covid-19 (10/27/2022), Diverticulitis (04/23/2024), Dysfunction of eustachian tube (01/17/2024), Elevated blood-pressure reading, without diagnosis of hypertension (11/11/2016), Hematuria (04/19/2023), Hypoxia (01/17/2024), Ingrowing nail (08/27/2017), Injury of kidney (10/27/2022), Leukocytosis (04/04/2023), Localized edema (03/15/2019), Morbid (severe) obesity due to excess calories (Multi) (04/07/2021), Other chest pain (05/17/2019), Other transient cerebral ischemic attacks and related syndromes, Personal history of diseases of the blood and blood-forming organs and certain disorders involving the  "immune mechanism (05/17/2019), Personal history of other diseases of the nervous system and sense organs (01/13/2020), Personal history of other diseases of the respiratory system (11/06/2017), Personal history of other diseases of the respiratory system (06/25/2019), Personal history of other diseases of the respiratory system (02/08/2016), Personal history of other endocrine, nutritional and metabolic disease (10/06/2021), Personal history of other specified conditions (01/22/2019), Personal history of other specified conditions (08/15/2018), Personal history of transient ischemic attack (TIA), and cerebral infarction without residual deficits (05/05/2017), Septic shock (Multi) (01/17/2024), and Unspecified symptoms and signs involving the genitourinary system (05/09/2019).   has a past surgical history that includes Other surgical history (11/06/2017); Tonsillectomy (11/06/2017); and Other surgical history (09/19/2022).  Family History[1]  Oncology History    No history exists.       Hager W Claudiageorge \"Kevin\"  reports that he has quit smoking. His smoking use included cigarettes. He has never used smokeless tobacco.  He  reports that he does not currently use alcohol.  He  reports no history of drug use.    Physical Exam  Constitutional:       Appearance: Normal appearance.   HENT:      Head: Normocephalic and atraumatic.   Eyes:      Extraocular Movements: Extraocular movements intact.      Pupils: Pupils are equal, round, and reactive to light.   Neurological:      Mental Status: He is alert.         WBC   Date/Time Value Ref Range Status   04/18/2025 09:24 AM 9.9 4.4 - 11.3 x10*3/uL Final   03/13/2025 08:40 AM 8.2 4.4 - 11.3 x10*3/uL Final   11/26/2024 08:50 AM 9.5 4.4 - 11.3 x10*3/uL Final     WHITE BLOOD CELL COUNT   Date/Time Value Ref Range Status   02/07/2025 11:49 AM 10.0 3.8 - 10.8 Thousand/uL Final     nRBC   Date Value Ref Range Status   04/18/2025 0.0 0.0 - 0.0 /100 WBCs Final   11/26/2024 0.0 0.0 " - 0.0 /100 WBCs Final   09/26/2024 0.0 0.0 - 0.0 /100 WBCs Final     RBC   Date Value Ref Range Status   04/18/2025 4.98 4.50 - 5.90 x10*6/uL Final   03/13/2025 4.93 4.50 - 5.90 x10*6/uL Final   11/26/2024 5.21 4.50 - 5.90 x10*6/uL Final     RED BLOOD CELL COUNT   Date Value Ref Range Status   02/07/2025 5.52 4.20 - 5.80 Million/uL Final     Hemoglobin   Date Value Ref Range Status   04/18/2025 15.4 13.5 - 17.5 g/dL Final   03/13/2025 14.1 13.5 - 17.5 g/dL Final   11/26/2024 15.3 13.5 - 17.5 g/dL Final     HEMOGLOBIN   Date Value Ref Range Status   02/07/2025 16.0 13.2 - 17.1 g/dL Final     Hematocrit   Date Value Ref Range Status   04/18/2025 45.9 41.0 - 52.0 % Final   03/13/2025 43.9 41.0 - 52.0 % Final   11/26/2024 47.5 41.0 - 52.0 % Final     HEMATOCRIT   Date Value Ref Range Status   02/07/2025 49.2 38.5 - 50.0 % Final     MCV   Date/Time Value Ref Range Status   04/18/2025 09:24 AM 92 80 - 100 fL Final   03/13/2025 08:40 AM 89 80 - 100 fL Final   02/07/2025 11:49 AM 89.1 80.0 - 100.0 fL Final   11/26/2024 08:50 AM 91 80 - 100 fL Final     MCH   Date/Time Value Ref Range Status   04/18/2025 09:24 AM 30.9 26.0 - 34.0 pg Final   03/13/2025 08:40 AM 28.6 26.0 - 34.0 pg Final   02/07/2025 11:49 AM 29.0 27.0 - 33.0 pg Final   11/26/2024 08:50 AM 29.4 26.0 - 34.0 pg Final     MCHC   Date/Time Value Ref Range Status   04/18/2025 09:24 AM 33.6 32.0 - 36.0 g/dL Final   03/13/2025 08:40 AM 32.1 32.0 - 36.0 g/dL Final   02/07/2025 11:49 AM 32.5 32.0 - 36.0 g/dL Final     Comment:     For adults, a slight decrease in the calculated MCHC  value (in the range of 30 to 32 g/dL) is most likely  not clinically significant; however, it should be  interpreted with caution in correlation with other  red cell parameters and the patient's clinical  condition.     11/26/2024 08:50 AM 32.2 32.0 - 36.0 g/dL Final     RDW   Date/Time Value Ref Range Status   04/18/2025 09:24 AM 15.5 (H) 11.5 - 14.5 % Final   03/13/2025 08:40 AM 13.8  11.5 - 14.5 % Final   02/07/2025 11:49 AM 13.2 11.0 - 15.0 % Final   11/26/2024 08:50 AM 13.1 11.5 - 14.5 % Final     Platelets   Date/Time Value Ref Range Status   04/18/2025 09:24  150 - 450 x10*3/uL Final   03/13/2025 08:40  150 - 450 x10*3/uL Final   11/26/2024 08:50  150 - 450 x10*3/uL Final     PLATELET COUNT   Date/Time Value Ref Range Status   02/07/2025 11:49  140 - 400 Thousand/uL Final     MPV   Date/Time Value Ref Range Status   02/07/2025 11:49 AM 9.8 7.5 - 12.5 fL Final     Neutrophils %   Date/Time Value Ref Range Status   04/18/2025 09:24 AM 70.8 40.0 - 80.0 % Final   03/13/2025 08:40 AM 57.7 40.0 - 80.0 % Final   09/26/2024 11:35 AM 65.4 40.0 - 80.0 % Final     Immature Granulocytes %, Automated   Date/Time Value Ref Range Status   04/18/2025 09:24 AM 0.5 0.0 - 0.9 % Final     Comment:     Immature Granulocyte Count (IG) includes promyelocytes, myelocytes and metamyelocytes but does not include bands. Percent differential counts (%) should be interpreted in the context of the absolute cell counts (cells/UL).   03/13/2025 08:40 AM 1.3 (H) 0.0 - 0.9 % Final     Comment:     Immature Granulocyte Count (IG) includes promyelocytes, myelocytes and metamyelocytes but does not include bands. Percent differential counts (%) should be interpreted in the context of the absolute cell counts (cells/UL).   09/26/2024 11:35 AM 1.3 (H) 0.0 - 0.9 % Final     Comment:     Immature Granulocyte Count (IG) includes promyelocytes, myelocytes and metamyelocytes but does not include bands. Percent differential counts (%) should be interpreted in the context of the absolute cell counts (cells/UL).     Lymphocytes %   Date/Time Value Ref Range Status   04/18/2025 09:24 AM 18.4 13.0 - 44.0 % Final   03/13/2025 08:40 AM 26.1 13.0 - 44.0 % Final   09/26/2024 11:35 AM 20.8 13.0 - 44.0 % Final     LYMPHOCYTES   Date/Time Value Ref Range Status   02/07/2025 11:49 AM 7.6 % Final     Monocytes %   Date/Time  Value Ref Range Status   04/18/2025 09:24 AM 7.8 2.0 - 10.0 % Final   03/13/2025 08:40 AM 10.1 2.0 - 10.0 % Final   09/26/2024 11:35 AM 9.9 2.0 - 10.0 % Final     MONOCYTES   Date/Time Value Ref Range Status   02/07/2025 11:49 AM 9.3 % Final     Eosinophils %   Date/Time Value Ref Range Status   04/18/2025 09:24 AM 1.8 0.0 - 6.0 % Final   03/13/2025 08:40 AM 4.1 0.0 - 6.0 % Final   09/26/2024 11:35 AM 1.8 0.0 - 6.0 % Final     EOSINOPHILS   Date/Time Value Ref Range Status   02/07/2025 11:49 AM 0.2 % Final     Basophils %   Date/Time Value Ref Range Status   04/18/2025 09:24 AM 0.7 0.0 - 2.0 % Final   03/13/2025 08:40 AM 0.7 0.0 - 2.0 % Final   09/26/2024 11:35 AM 0.8 0.0 - 2.0 % Final     BASOPHILS   Date/Time Value Ref Range Status   02/07/2025 11:49 AM 0.5 % Final     Neutrophils Absolute   Date/Time Value Ref Range Status   04/18/2025 09:24 AM 7.02 (H) 1.60 - 5.50 x10*3/uL Final     Comment:     Percent differential counts (%) should be interpreted in the context of the absolute cell counts (cells/uL).   03/13/2025 08:40 AM 4.75 1.60 - 5.50 x10*3/uL Final     Comment:     Percent differential counts (%) should be interpreted in the context of the absolute cell counts (cells/uL).   09/26/2024 11:35 AM 7.08 (H) 1.60 - 5.50 x10*3/uL Final     Comment:     Percent differential counts (%) should be interpreted in the context of the absolute cell counts (cells/uL).     Immature Granulocytes Absolute, Automated   Date/Time Value Ref Range Status   04/18/2025 09:24 AM 0.05 0.00 - 0.50 x10*3/uL Final   03/13/2025 08:40 AM 0.11 0.00 - 0.50 x10*3/uL Final   09/26/2024 11:35 AM 0.14 0.00 - 0.50 x10*3/uL Final     Lymphocytes Absolute   Date/Time Value Ref Range Status   04/18/2025 09:24 AM 1.83 0.80 - 3.00 x10*3/uL Final   03/13/2025 08:40 AM 2.15 0.80 - 3.00 x10*3/uL Final   09/26/2024 11:35 AM 2.25 0.80 - 3.00 x10*3/uL Final     Monocytes Absolute   Date/Time Value Ref Range Status   04/18/2025 09:24 AM 0.77 0.05 - 0.80  "x10*3/uL Final   03/13/2025 08:40 AM 0.83 (H) 0.05 - 0.80 x10*3/uL Final   09/26/2024 11:35 AM 1.07 (H) 0.05 - 0.80 x10*3/uL Final     Eosinophils Absolute   Date/Time Value Ref Range Status   04/18/2025 09:24 AM 0.18 0.00 - 0.40 x10*3/uL Final   03/13/2025 08:40 AM 0.34 0.00 - 0.40 x10*3/uL Final   09/26/2024 11:35 AM 0.20 0.00 - 0.40 x10*3/uL Final     ABSOLUTE EOSINOPHILS   Date/Time Value Ref Range Status   02/07/2025 11:49 AM 20 15 - 500 cells/uL Final     Basophils Absolute   Date/Time Value Ref Range Status   04/18/2025 09:24 AM 0.07 0.00 - 0.10 x10*3/uL Final   03/13/2025 08:40 AM 0.06 0.00 - 0.10 x10*3/uL Final   09/26/2024 11:35 AM 0.09 0.00 - 0.10 x10*3/uL Final     ABSOLUTE BASOPHILS   Date/Time Value Ref Range Status   02/07/2025 11:49 AM 50 0 - 200 cells/uL Final       No components found for: \"PT\"  aPTT   Date/Time Value Ref Range Status   09/03/2024 05:48 PM 28 27 - 38 seconds Final   10/10/2022 11:30 AM 28 26 - 39 sec Final     Comment:       THE APTT IS NO LONGER USED FOR MONITORING     UNFRACTIONATED HEPARIN THERAPY.    FOR MONITORING HEPARIN THERAPY,     USE THE HEPARIN ASSAY.         Assessment/Plan      10/18/2024: Patient seen today no complaints will continue minimal disease testing. Return in 6 months. MRD testing negative for disease recurrence.     04/22/2025 patient seen.  Bladder cancer as a diagnosis minimal residual disease testing with Signatera is negative dated 3/28/2025 this is the second Signatera request and the fourth negative minimal residual disease testing since September 2024.     Diagnoses and all orders for this visit:  Bladder carcinoma  -     Clinic Appointment Request Follow Up; [facility]  -     Clinic Appointment Request Follow Up; Future  -     Signatera; [facility] - Miscellaneous Genetics Test; Standing           Marivel-Woody Saenz MD                              [1]   Family History  Problem Relation Name Age of Onset    GI problems Mother      Heart failure " Mother      Heart disease Father      Heart attack Father

## 2025-04-22 NOTE — PROGRESS NOTES
"EDPD Note: Initiation     Contacted Madan WHEEELR Claudiageorge \"Kevin\" regarding a positive urine culture/result that was taken during their recent emergency room visit. I completed education with spouse. The patient is not being treated appropriately.    Patient presented to the ED for syncopal episode during pulmonary rehab, denied urinary symptoms. Suprapubic catheter (port) due to urinary retention, history of bladder removal due to bladder cancer. Patient takes chronic Keflex 250mg daily for prevention, which will not treat pseudomonas. Discharged with no additional antibiotics. At time of call, patient's wife reports he has been doing well, not noticed any odor or discoloration of urine. Due to pseudomonal bacteria, recommended treatment with cipro. Prescription sent to pharmacy, wife prefers to discuss with PCP prior to starting. Advised to follow up with PCP or urology as needed or if symptoms develop.     The following prescription was sent to the patient's preferred pharmacy. No further follow up needed from EDPD Team.     Drug: Cipro 250mg  Sig: BID x5  Qty: 10  Days Supply: 5    Susceptibility data from last 90 days.  Collected Specimen Info Organism Aztreonam Cefepime Ceftazidime Ciprofloxacin Levofloxacin Piperacillin/Tazobactam Tobramycin   04/18/25 Urine from Indwelling ([facility]avez) Catheter Pseudomonas aeruginosa  S  S  S  S  S  S  S       If there are any other questions for the ED Post-Discharge Culture Follow Up Team, please contact [phone]. Fax: [phone].    Lluvia Pascual, PharmD  "

## 2025-05-14 LAB
MGC ASCENT PFT - FEV1 - PRE: 2.21
MGC ASCENT PFT - FEV1 - PREDICTED: 3.02
MGC ASCENT PFT - FVC - PRE: 3.37
MGC ASCENT PFT - FVC - PREDICTED: 4.01

## 2025-05-23 ENCOUNTER — OFFICE VISIT (OUTPATIENT)
Dept: PULMONOLOGY | Facility: CLINIC | Age: 70
End: 2025-05-23
Payer: MEDICARE

## 2025-05-23 VITALS
OXYGEN SATURATION: 94 % | DIASTOLIC BLOOD PRESSURE: 76 MMHG | BODY MASS INDEX: 35.51 KG/M2 | RESPIRATION RATE: 16 BRPM | HEART RATE: 81 BPM | WEIGHT: 247.5 LBS | SYSTOLIC BLOOD PRESSURE: 151 MMHG

## 2025-05-23 DIAGNOSIS — Z87.891 EX-CIGARETTE SMOKER: ICD-10-CM

## 2025-05-23 DIAGNOSIS — J96.91 RESPIRATORY FAILURE WITH HYPOXIA, UNSPECIFIED CHRONICITY: ICD-10-CM

## 2025-05-23 DIAGNOSIS — R91.8 LUNG NODULES: ICD-10-CM

## 2025-05-23 DIAGNOSIS — J44.9 COPD, MILD (MULTI): Primary | ICD-10-CM

## 2025-05-23 PROCEDURE — 3077F SYST BP >= 140 MM HG: CPT | Performed by: INTERNAL MEDICINE

## 2025-05-23 PROCEDURE — 1036F TOBACCO NON-USER: CPT | Performed by: INTERNAL MEDICINE

## 2025-05-23 PROCEDURE — 99213 OFFICE O/P EST LOW 20 MIN: CPT | Performed by: INTERNAL MEDICINE

## 2025-05-23 PROCEDURE — 1126F AMNT PAIN NOTED NONE PRSNT: CPT | Performed by: INTERNAL MEDICINE

## 2025-05-23 PROCEDURE — 1160F RVW MEDS BY RX/DR IN RCRD: CPT | Performed by: INTERNAL MEDICINE

## 2025-05-23 PROCEDURE — 1159F MED LIST DOCD IN RCRD: CPT | Performed by: INTERNAL MEDICINE

## 2025-05-23 PROCEDURE — 4010F ACE/ARB THERAPY RXD/TAKEN: CPT | Performed by: INTERNAL MEDICINE

## 2025-05-23 PROCEDURE — 3078F DIAST BP <80 MM HG: CPT | Performed by: INTERNAL MEDICINE

## 2025-05-23 ASSESSMENT — ENCOUNTER SYMPTOMS
GASTROINTESTINAL NEGATIVE: 1
PSYCHIATRIC NEGATIVE: 1
DEPRESSION: 0
COUGH: 0
SHORTNESS OF BREATH: 1
FEVER: 0
NEUROLOGICAL NEGATIVE: 1
CARDIOVASCULAR NEGATIVE: 1
CHILLS: 0

## 2025-05-23 ASSESSMENT — PATIENT HEALTH QUESTIONNAIRE - PHQ9
1. LITTLE INTEREST OR PLEASURE IN DOING THINGS: NOT AT ALL
2. FEELING DOWN, DEPRESSED OR HOPELESS: NOT AT ALL
SUM OF ALL RESPONSES TO PHQ9 QUESTIONS 1 AND 2: 0

## 2025-05-23 ASSESSMENT — PAIN SCALES - GENERAL: PAINLEVEL_OUTOF10: 0-NO PAIN

## 2025-05-23 ASSESSMENT — COLUMBIA-SUICIDE SEVERITY RATING SCALE - C-SSRS
6. HAVE YOU EVER DONE ANYTHING, STARTED TO DO ANYTHING, OR PREPARED TO DO ANYTHING TO END YOUR LIFE?: NO
2. HAVE YOU ACTUALLY HAD ANY THOUGHTS OF KILLING YOURSELF?: NO
1. IN THE PAST MONTH, HAVE YOU WISHED YOU WERE DEAD OR WISHED YOU COULD GO TO SLEEP AND NOT WAKE UP?: NO

## 2025-05-23 NOTE — ASSESSMENT & PLAN NOTE
NL PFTs 4/2025. +eos.  Had vasovagal episode during pft on 4/2025.  Previously completed [place].  Hesistant to use any inhalers due to previous dental issues.

## 2025-05-23 NOTE — PROGRESS NOTES
"Subjective   Patient ID: aMdan Marquez \"Carmelita" is a 69 y.o. male who presents for Lung Eval.  h/o COPD here for f/u. NL PFT (4/2025). Stable nodules on CT.  Had an apparent vasovagal episode last PFT.   No fevers, chills or cough.  Using O2 at 4L.  Feeling better today.  Uses O2 at night.  Hesitant to use his inhalers due to dental issues in the past.  No fevers, chills.  Cough has been ok.   Has not needed rescue inhaler.   ET is 125 feet but limited by hip pain.      Shortness of Breath  Pertinent negatives include no fever or rash.       Review of Systems   Constitutional:  Negative for chills and fever.   Respiratory:  Positive for shortness of breath. Negative for cough.    Cardiovascular: Negative.    Gastrointestinal: Negative.    Skin:  Negative for rash.   Neurological: Negative.    Psychiatric/Behavioral: Negative.     All other systems reviewed and are negative.      Objective   Physical Exam  Vitals reviewed.   Constitutional:       Appearance: Normal appearance.   HENT:      Head: Normocephalic and atraumatic.   Eyes:      Extraocular Movements: Extraocular movements intact.   Cardiovascular:      Rate and Rhythm: Normal rate and regular rhythm.      Heart sounds: Normal heart sounds.   Pulmonary:      Effort: Pulmonary effort is normal.      Breath sounds: Normal breath sounds.   Abdominal:      Palpations: Abdomen is soft.      Tenderness: There is no abdominal tenderness.   Musculoskeletal:      Cervical back: Normal range of motion.   Skin:     General: Skin is warm.   Neurological:      General: No focal deficit present.      Mental Status: He is alert and oriented to person, place, and time. Mental status is at baseline.   Psychiatric:         Mood and Affect: Mood normal.         Behavior: Behavior normal.         Assessment/Plan   Problem List Items Addressed This Visit       COPD, mild (Multi) - Primary    NL PFTs 4/2025. +eos.  Had vasovagal episode during pft on 4/2025.  Previously " completed [place].  Hesistant to use any inhalers due to previous dental issues.         Respiratory failure with hypoxia    Pt required 4L on desat study today. Lowest O2 sat was 84% on RA.  Will order O2 therapy for him         Lung nodules    Stable on recent CT.  Has Cts w/ urology every 6 months         Ex-cigarette smoker    Former smoker - 100 pack year history, quit in 2019. Cont lung Ca screening when pt is not getting routine CTs by oncology.  Cont until turns 76.          RTC in 6 months    Time Spent  Prep time on day of patient encounter: 5 minutes  Time spent directly with patient, family or caregiver: 10 minutes  Additional Time Spent on Patient Care Activities: 0 minutes  Documentation Time: 5 minutes  Other Time Spent: 0 minutes  Total: 20 minutes        Franco Tomlin MD 05/23/25 4:22 PM

## 2025-06-02 ENCOUNTER — APPOINTMENT (OUTPATIENT)
Dept: PULMONOLOGY | Facility: CLINIC | Age: 70
End: 2025-06-02
Payer: MEDICARE

## 2025-06-03 ENCOUNTER — APPOINTMENT (OUTPATIENT)
Dept: PRIMARY CARE | Facility: CLINIC | Age: 70
End: 2025-06-03
Payer: MEDICARE

## 2025-06-03 VITALS
SYSTOLIC BLOOD PRESSURE: 124 MMHG | HEART RATE: 73 BPM | TEMPERATURE: 98.4 F | BODY MASS INDEX: 35.84 KG/M2 | OXYGEN SATURATION: 98 % | DIASTOLIC BLOOD PRESSURE: 70 MMHG | WEIGHT: 242 LBS | HEIGHT: 69 IN

## 2025-06-03 DIAGNOSIS — J44.9 COPD, MILD (MULTI): ICD-10-CM

## 2025-06-03 DIAGNOSIS — Z93.6 PRESENCE OF UROSTOMY (MULTI): ICD-10-CM

## 2025-06-03 DIAGNOSIS — E11.9 TYPE 2 DIABETES MELLITUS WITHOUT COMPLICATION, WITHOUT LONG-TERM CURRENT USE OF INSULIN: ICD-10-CM

## 2025-06-03 DIAGNOSIS — C67.9 BLADDER CARCINOMA: ICD-10-CM

## 2025-06-03 DIAGNOSIS — Z12.5 SCREENING FOR PROSTATE CANCER: ICD-10-CM

## 2025-06-03 DIAGNOSIS — Z00.00 ROUTINE GENERAL MEDICAL EXAMINATION AT HEALTH CARE FACILITY: Primary | ICD-10-CM

## 2025-06-03 DIAGNOSIS — E87.1 HYPONATREMIA: ICD-10-CM

## 2025-06-03 DIAGNOSIS — R73.9 HYPERGLYCEMIA: ICD-10-CM

## 2025-06-03 DIAGNOSIS — I10 PRIMARY HYPERTENSION: ICD-10-CM

## 2025-06-03 DIAGNOSIS — E78.2 MIXED HYPERLIPIDEMIA: ICD-10-CM

## 2025-06-03 PROBLEM — R59.9 ADENOPATHY: Status: RESOLVED | Noted: 2023-05-17 | Resolved: 2025-06-03

## 2025-06-03 PROBLEM — J96.91 RESPIRATORY FAILURE WITH HYPOXIA: Status: RESOLVED | Noted: 2023-04-19 | Resolved: 2025-06-03

## 2025-06-03 LAB
ALBUMIN SERPL BCP-MCNC: 4.1 G/DL (ref 3.4–5)
ALP SERPL-CCNC: 57 U/L (ref 33–136)
ALT SERPL W P-5'-P-CCNC: 23 U/L (ref 10–52)
ANION GAP SERPL CALC-SCNC: 13 MMOL/L (ref 10–20)
AST SERPL W P-5'-P-CCNC: 16 U/L (ref 9–39)
BASOPHILS # BLD AUTO: 0.07 X10*3/UL (ref 0–0.1)
BASOPHILS NFR BLD AUTO: 0.7 %
BILIRUB SERPL-MCNC: 0.7 MG/DL (ref 0–1.2)
BUN SERPL-MCNC: 20 MG/DL (ref 6–23)
CALCIUM SERPL-MCNC: 9.2 MG/DL (ref 8.6–10.3)
CHLORIDE SERPL-SCNC: 105 MMOL/L (ref 98–107)
CHOLEST SERPL-MCNC: 155 MG/DL (ref 0–199)
CHOLESTEROL/HDL RATIO: 5.4
CO2 SERPL-SCNC: 24 MMOL/L (ref 21–32)
CREAT SERPL-MCNC: 1.14 MG/DL (ref 0.5–1.3)
EGFRCR SERPLBLD CKD-EPI 2021: 69 ML/MIN/1.73M*2
EOSINOPHIL # BLD AUTO: 0.18 X10*3/UL (ref 0–0.4)
EOSINOPHIL NFR BLD AUTO: 1.9 %
ERYTHROCYTE [DISTWIDTH] IN BLOOD BY AUTOMATED COUNT: 13 % (ref 11.5–14.5)
GLUCOSE SERPL-MCNC: 79 MG/DL (ref 74–99)
HCT VFR BLD AUTO: 46.1 % (ref 41–52)
HDLC SERPL-MCNC: 28.8 MG/DL
HGB BLD-MCNC: 14.9 G/DL (ref 13.5–17.5)
IMM GRANULOCYTES # BLD AUTO: 0.03 X10*3/UL (ref 0–0.5)
IMM GRANULOCYTES NFR BLD AUTO: 0.3 % (ref 0–0.9)
LDLC SERPL CALC-MCNC: 73 MG/DL
LYMPHOCYTES # BLD AUTO: 2.08 X10*3/UL (ref 0.8–3)
LYMPHOCYTES NFR BLD AUTO: 22.2 %
MCH RBC QN AUTO: 30 PG (ref 26–34)
MCHC RBC AUTO-ENTMCNC: 32.3 G/DL (ref 32–36)
MCV RBC AUTO: 93 FL (ref 80–100)
MONOCYTES # BLD AUTO: 0.86 X10*3/UL (ref 0.05–0.8)
MONOCYTES NFR BLD AUTO: 9.2 %
NEUTROPHILS # BLD AUTO: 6.14 X10*3/UL (ref 1.6–5.5)
NEUTROPHILS NFR BLD AUTO: 65.7 %
NON HDL CHOLESTEROL: 126 MG/DL (ref 0–149)
PLATELET # BLD AUTO: 250 X10*3/UL (ref 150–450)
POC HEMOGLOBIN A1C: 7.1 % (ref 4.2–6.5)
POTASSIUM SERPL-SCNC: 4.1 MMOL/L (ref 3.5–5.3)
PROT SERPL-MCNC: 7.2 G/DL (ref 6.4–8.2)
RBC # BLD AUTO: 4.96 X10*6/UL (ref 4.5–5.9)
SODIUM SERPL-SCNC: 138 MMOL/L (ref 136–145)
T4 FREE SERPL-MCNC: 0.96 NG/DL (ref 0.61–1.12)
TRIGL SERPL-MCNC: 265 MG/DL (ref 0–149)
TSH SERPL-ACNC: 2.05 MIU/L (ref 0.44–3.98)
VLDL: 53 MG/DL (ref 0–40)
WBC # BLD AUTO: 9.4 X10*3/UL (ref 4.4–11.3)

## 2025-06-03 PROCEDURE — 84439 ASSAY OF FREE THYROXINE: CPT | Performed by: FAMILY MEDICINE

## 2025-06-03 PROCEDURE — 80061 LIPID PANEL: CPT | Performed by: FAMILY MEDICINE

## 2025-06-03 PROCEDURE — 3074F SYST BP LT 130 MM HG: CPT | Performed by: FAMILY MEDICINE

## 2025-06-03 PROCEDURE — 1160F RVW MEDS BY RX/DR IN RCRD: CPT | Performed by: FAMILY MEDICINE

## 2025-06-03 PROCEDURE — 84153 ASSAY OF PSA TOTAL: CPT | Mod: GEALAB | Performed by: FAMILY MEDICINE

## 2025-06-03 PROCEDURE — 1170F FXNL STATUS ASSESSED: CPT | Performed by: FAMILY MEDICINE

## 2025-06-03 PROCEDURE — 84443 ASSAY THYROID STIM HORMONE: CPT | Performed by: FAMILY MEDICINE

## 2025-06-03 PROCEDURE — 4010F ACE/ARB THERAPY RXD/TAKEN: CPT | Performed by: FAMILY MEDICINE

## 2025-06-03 PROCEDURE — G0439 PPPS, SUBSEQ VISIT: HCPCS | Performed by: FAMILY MEDICINE

## 2025-06-03 PROCEDURE — 3048F LDL-C <100 MG/DL: CPT | Performed by: FAMILY MEDICINE

## 2025-06-03 PROCEDURE — 83036 HEMOGLOBIN GLYCOSYLATED A1C: CPT | Performed by: FAMILY MEDICINE

## 2025-06-03 PROCEDURE — 1159F MED LIST DOCD IN RCRD: CPT | Performed by: FAMILY MEDICINE

## 2025-06-03 PROCEDURE — 3008F BODY MASS INDEX DOCD: CPT | Performed by: FAMILY MEDICINE

## 2025-06-03 PROCEDURE — 3051F HG A1C>EQUAL 7.0%<8.0%: CPT | Performed by: FAMILY MEDICINE

## 2025-06-03 PROCEDURE — 3078F DIAST BP <80 MM HG: CPT | Performed by: FAMILY MEDICINE

## 2025-06-03 PROCEDURE — 80053 COMPREHEN METABOLIC PANEL: CPT | Performed by: FAMILY MEDICINE

## 2025-06-03 PROCEDURE — 99214 OFFICE O/P EST MOD 30 MIN: CPT | Performed by: FAMILY MEDICINE

## 2025-06-03 PROCEDURE — 1036F TOBACCO NON-USER: CPT | Performed by: FAMILY MEDICINE

## 2025-06-03 PROCEDURE — 85025 COMPLETE CBC W/AUTO DIFF WBC: CPT | Performed by: FAMILY MEDICINE

## 2025-06-03 PROCEDURE — 36415 COLL VENOUS BLD VENIPUNCTURE: CPT | Performed by: FAMILY MEDICINE

## 2025-06-03 RX ORDER — ROSUVASTATIN CALCIUM 10 MG/1
10 TABLET, COATED ORAL DAILY
Qty: 100 TABLET | Refills: 0 | Status: SHIPPED | OUTPATIENT
Start: 2025-06-03 | End: 2026-07-08

## 2025-06-03 RX ORDER — CEPHALEXIN 500 MG/1
500 CAPSULE ORAL DAILY
Qty: 100 CAPSULE | Refills: 3 | Status: SHIPPED | OUTPATIENT
Start: 2025-06-03

## 2025-06-03 RX ORDER — TIOTROPIUM BROMIDE 18 UG/1
1 CAPSULE ORAL; RESPIRATORY (INHALATION)
Qty: 90 CAPSULE | Refills: 3 | Status: SHIPPED | OUTPATIENT
Start: 2025-06-03 | End: 2026-05-29

## 2025-06-03 RX ORDER — METFORMIN HYDROCHLORIDE 500 MG/1
500 TABLET ORAL
Qty: 200 TABLET | Refills: 0 | Status: SHIPPED | OUTPATIENT
Start: 2025-06-03 | End: 2026-07-08

## 2025-06-03 ASSESSMENT — ACTIVITIES OF DAILY LIVING (ADL)
MANAGING_FINANCES: INDEPENDENT
TAKING_MEDICATION: INDEPENDENT
GROCERY_SHOPPING: INDEPENDENT
DOING_HOUSEWORK: INDEPENDENT
DRESSING: INDEPENDENT
BATHING: INDEPENDENT

## 2025-06-03 NOTE — PATIENT INSTRUCTIONS
Get your blood work as ordered.  You should hear from our office with results whether they are normal are not within a few days.  Please call the office if you do not hear from us.     Diabetes: Requires medications now discussed pros and cons of the patient's will start metformin    COPD/ emphysema :   Continue your medications as prescribed.   Use your inhalers as directed.  If you are having problems with shortness of breath, please let your doctor know.  There are numerous inhalers that you can use that can keep your symptoms under control.  If you are still smoking, it is imperative that you quit smoking and avoid being around people that smoke.  Quitting smoking even after years of smoking will help your lung function.  If you get new symptoms such as worsening shortness of breath or cough that won't go away you need reevaluated by your doctor.  With COPD you need to see your physician at least twice per year.    Add of spiriva , let me know if not helping   , Discussed mechanism of action with the patient and unlikely effect on his dental issues      Bladder cancer, urology follow-up    There are several recommendations for colon cancer screening.  The new recommendations include screening at the age of 45 and above.  Colonoscopy generally is the best test for colon cancer screening.  This involves a procedure GI doctor looks at your colon through a camera.  The benefit of doing the colonoscopy as polyps can be removed during the colonoscopy to help prevent cancer.  This involves light sedation and requires cleaning out the colon before the procedure.  If you do not have any substantial polyps this is done every 10 years.  The other option for colon cancer screening is Cologuard stool tests.  These are almost as effective with regards to picking up colon cancer.  However, they do not  polyps so its not preventing cancer.  These are done every 3 years.  If you have a positive Cologuard test then you  need to proceed with a colonoscopy.      See eye dr umanzor     Add on metformin twice daily     See Dr Morales for ostomy follow up       The statin class of cholesterol drugs has been shown to have a significant benefit in reduction of heart disease, stroke and death.  The medications reduce the risk of heart attack and stroke by 25% and the risk of death by about 20 %.   The medications work by improving lipids, improving the cell function , stabilizing atherosclerosis ( hardening of artery ) and inhibiting inflammation in blood vessels.  The problems with side effects are comparatively very low.  The risk of muscle pain is about 5% in statin use and the risk of liver damage is < 0.06%.  Therefore with these medications the benefit far outweighs the risk and I would recommend you take the medications.      It is however important to get your blood work checked periodically and report any significant increase risk in muscle pains, especially diffuse all over muscle pains.      Follow up with me 3 months     Advanced directives: I discussed with the patient  advanced care planning including explanation of discussions on advance directives.  If patient does not have current up-to-date documents, examples and information provided on living will and power of .  Patient was encouraged to work on getting these documents completed.  [place] has simple advance directives and living will forms that can be used.  Also, you can get more involved forms done through a  if you desire more detail on your living will plans or more involved plans for power of .      Do inhaler first   Start metformin in a week then rosuvastatin a week later to assess for side effects   If persisting shortness of breath especially on exertion needs cardiac follow-up patient does have aortic stenosis, moderate

## 2025-06-03 NOTE — ASSESSMENT & PLAN NOTE
Orders:    metFORMIN (Glucophage) 500 mg tablet; Take 1 tablet (500 mg) by mouth 2 times daily (morning and late afternoon).    POCT glycosylated hemoglobin (Hb A1C) manually resulted

## 2025-06-03 NOTE — ASSESSMENT & PLAN NOTE
Orders:    Comprehensive Metabolic Panel; Future    CBC and Auto Differential; Future    Thyroid Stimulating Hormone; Future    Thyroxine, Free; Future    Prostate Specific Antigen, Screen; Future    Lipid Panel; Future    tiotropium (Spiriva) 18 mcg inhalation capsule; Place 1 capsule (18 mcg) into inhaler and inhale once daily.

## 2025-06-03 NOTE — ASSESSMENT & PLAN NOTE
Orders:    Comprehensive Metabolic Panel; Future    CBC and Auto Differential; Future    Thyroid Stimulating Hormone; Future    Thyroxine, Free; Future    Prostate Specific Antigen, Screen; Future    Lipid Panel; Future    cephalexin (Keflex) 500 mg capsule; Take 1 capsule (500 mg) by mouth once daily.

## 2025-06-03 NOTE — ASSESSMENT & PLAN NOTE
Orders:    Comprehensive Metabolic Panel; Future    CBC and Auto Differential; Future    Thyroid Stimulating Hormone; Future    Thyroxine, Free; Future    Prostate Specific Antigen, Screen; Future    Lipid Panel; Future    rosuvastatin (Crestor) 10 mg tablet; Take 1 tablet (10 mg) by mouth once daily.

## 2025-06-03 NOTE — ASSESSMENT & PLAN NOTE
Orders:    Comprehensive Metabolic Panel; Future    CBC and Auto Differential; Future    Thyroid Stimulating Hormone; Future    Thyroxine, Free; Future    Prostate Specific Antigen, Screen; Future    Lipid Panel; Future

## 2025-06-03 NOTE — PROGRESS NOTES
"Subjective   Reason for Visit: Madan Marquez is an 69 y.o. male here for a Medicare Wellness visit.     Past Medical, Surgical, and Family History reviewed and updated in chart.    Reviewed all medications by prescribing practitioner or clinical pharmacist (such as prescriptions, OTCs, herbal therapies and supplements) and documented in the medical record.    Went to the ER in April evaluation for diaphoresis, negative evaluation    Some orthopnea   Worse SHORTNESS OF BREATH ON EXERTION lately   On O2   No inhalers, had problems with dental issues with previous inhalers so is hesitant to resume another inhaler  COPD:  seeing pulmonology    Bladder resection status post bladder cancer  , tumor markers negative, up-to-date with heme-onc visits,  On prophylactic antibiotics to prevent UTIs  Some slight dysuria  Rare blood from stoma  He is due to see the urologist in the next week or 2       Diabetes mellitus: Not on medications ,   Elevated HgA1c : Watching sugars, diet has been a lot better, sugars are in the 100s  Reduced carbs and sugars   Some exercise  No recent  eye dr     Enlarged aortic root: Is seeing cardiology for this, last visit was a few months ago  Echocardiograms have showed stable aortic root  Stress test 2019 was okay        Hypertension  : Patient is taking blood pressure medications as directed.  < 140/  70s   Pt denies Chest Pain or Shortness of Breath           Has not done a colonoscopy or stool card, does have the kit at home             Patient Care Team:  Beatrice Francis MD as PCP - General (Family Medicine)  Beatrice Francis MD as PCP - [facility] Attributed Provider  Chelsey Valencia MD as Referring Physician (Hematology and Oncology)     Review of Systems    Objective   Vitals:  /70   Pulse 73   Temp 36.9 °C (98.4 °F)   Ht 1.74 m (5' 8.5\")   Wt 110 kg (242 lb)   SpO2 98%   BMI 36.26 kg/m²       Physical Exam  Constitutional:       General: He is not in acute " distress.     Appearance: Normal appearance.   HENT:      Head: Normocephalic and atraumatic.      Right Ear: Tympanic membrane, ear canal and external ear normal.      Left Ear: Tympanic membrane, ear canal and external ear normal.      Nose: Nose normal.      Mouth/Throat:      Mouth: Mucous membranes are moist.      Pharynx: No oropharyngeal exudate or posterior oropharyngeal erythema.   Eyes:      Extraocular Movements: Extraocular movements intact.      Conjunctiva/sclera: Conjunctivae normal.      Pupils: Pupils are equal, round, and reactive to light.   Cardiovascular:      Rate and Rhythm: Normal rate and regular rhythm.      Heart sounds: Murmur heard.      Comments: 3 out of 6 systolic ejection murmur  Pulmonary:      Effort: Pulmonary effort is normal.      Breath sounds: Normal breath sounds.   Abdominal:      General: Bowel sounds are normal.      Palpations: Abdomen is soft.      Comments: Urostomy bag with a little hernia  No active bleeding  No surrounding skin rash   Musculoskeletal:         General: Normal range of motion.      Cervical back: No rigidity.      Comments: Trace ankle edema   Lymphadenopathy:      Cervical: No cervical adenopathy.   Skin:     General: Skin is warm and dry.      Findings: No rash.   Neurological:      General: No focal deficit present.      Mental Status: He is alert and oriented to person, place, and time.      Cranial Nerves: No cranial nerve deficit.      Gait: Gait normal.   Psychiatric:         Mood and Affect: Mood normal.         Behavior: Behavior normal.         Assessment & Plan  Routine general medical examination at health care facility    Orders:    1 Year Follow Up In Primary Care - Wellness Exam; Future    Comprehensive Metabolic Panel; Future    CBC and Auto Differential; Future    Thyroid Stimulating Hormone; Future    Thyroxine, Free; Future    Prostate Specific Antigen, Screen; Future    Lipid Panel; Future    Hyperglycemia    Orders:     Comprehensive Metabolic Panel; Future    CBC and Auto Differential; Future    Thyroid Stimulating Hormone; Future    Thyroxine, Free; Future    Prostate Specific Antigen, Screen; Future    Lipid Panel; Future    Mixed hyperlipidemia    Orders:    Comprehensive Metabolic Panel; Future    CBC and Auto Differential; Future    Thyroid Stimulating Hormone; Future    Thyroxine, Free; Future    Prostate Specific Antigen, Screen; Future    Lipid Panel; Future    rosuvastatin (Crestor) 10 mg tablet; Take 1 tablet (10 mg) by mouth once daily.    Presence of urostomy (Multi)    Orders:    Comprehensive Metabolic Panel; Future    CBC and Auto Differential; Future    Thyroid Stimulating Hormone; Future    Thyroxine, Free; Future    Prostate Specific Antigen, Screen; Future    Lipid Panel; Future    cephalexin (Keflex) 500 mg capsule; Take 1 capsule (500 mg) by mouth once daily.    Primary hypertension    Orders:    Comprehensive Metabolic Panel; Future    CBC and Auto Differential; Future    Thyroid Stimulating Hormone; Future    Thyroxine, Free; Future    Prostate Specific Antigen, Screen; Future    Lipid Panel; Future    Bladder carcinoma    Orders:    Comprehensive Metabolic Panel; Future    CBC and Auto Differential; Future    Thyroid Stimulating Hormone; Future    Thyroxine, Free; Future    Prostate Specific Antigen, Screen; Future    Lipid Panel; Future    COPD, mild (Multi)    Orders:    Comprehensive Metabolic Panel; Future    CBC and Auto Differential; Future    Thyroid Stimulating Hormone; Future    Thyroxine, Free; Future    Prostate Specific Antigen, Screen; Future    Lipid Panel; Future    tiotropium (Spiriva) 18 mcg inhalation capsule; Place 1 capsule (18 mcg) into inhaler and inhale once daily.    Hyponatremia    Orders:    Comprehensive Metabolic Panel; Future    CBC and Auto Differential; Future    Thyroid Stimulating Hormone; Future    Thyroxine, Free; Future    Prostate Specific Antigen, Screen; Future     Lipid Panel; Future    Type 2 diabetes mellitus without complication, without long-term current use of insulin    Orders:    metFORMIN (Glucophage) 500 mg tablet; Take 1 tablet (500 mg) by mouth 2 times daily (morning and late afternoon).    POCT glycosylated hemoglobin (Hb A1C) manually resulted    Screening for prostate cancer    Orders:    Prostate Specific Antigen, Screen; Future

## 2025-06-04 DIAGNOSIS — J44.9 COPD, MILD (MULTI): ICD-10-CM

## 2025-06-04 LAB — PSA SERPL-MCNC: <0.1 NG/ML

## 2025-06-04 RX ORDER — FLUTICASONE PROPIONATE AND SALMETEROL XINAFOATE 45; 21 UG/1; UG/1
AEROSOL, METERED RESPIRATORY (INHALATION)
Refills: 0 | OUTPATIENT
Start: 2025-06-04

## 2025-06-04 NOTE — RESULT ENCOUNTER NOTE
Labs generally all look good, triglycerides are little high but stable, prostate normal, rest of labs normal continue same medications

## 2025-06-05 ENCOUNTER — APPOINTMENT (OUTPATIENT)
Dept: RADIOLOGY | Facility: HOSPITAL | Age: 70
End: 2025-06-05
Payer: MEDICARE

## 2025-06-10 NOTE — PROGRESS NOTES
"Subjective   Patient ID: Madan Marquez \"Carmelita" is a 69 y.o. male.      HPI  69 y.o. male presents for a follow up visit. He is status post open radical cystoprostatectomy and ileal conduit diversion 01/07/2022. He did not complete the CT chest or CT urogram prior to the visit. The patient's scans are scheduled for 08/2025.    He continues to take the prescribed Keflex 250 mg daily. This was increased to 500mg after he recently had what appeared to be like an infection.    He is wearing a belt to assist with his peristomal hernia.     He went to the ED on 04/18/2025 due to shaking and diaphoresis. He was at pulmonary rehab and had a syncope episode. He denied fever, abdominal pain or elevated WBC.            Component      Latest Ref Rng 6/3/2025   Prostate Specific Antigen,Screen      <=4.00 ng/mL <0.10        Component      Latest Ref Rng 6/3/2025   GLUCOSE      74 - 99 mg/dL 79    SODIUM      136 - 145 mmol/L 138    POTASSIUM      3.5 - 5.3 mmol/L 4.1    CHLORIDE      98 - 107 mmol/L 105    Bicarbonate      21 - 32 mmol/L 24    Anion Gap      10 - 20 mmol/L 13    Blood Urea Nitrogen      6 - 23 mg/dL 20    Creatinine      0.50 - 1.30 mg/dL 1.14    Calcium      8.6 - 10.3 mg/dL 9.2    Albumin      3.4 - 5.0 g/dL 4.1    Alkaline Phosphatase      33 - 136 U/L 57    Total Protein      6.4 - 8.2 g/dL 7.2    AST      9 - 39 U/L 16    Bilirubin Total      0.0 - 1.2 mg/dL 0.7    ALT      10 - 52 U/L 23    EGFR      >60 mL/min/1.73m*2 69        Component      Latest Ref Rng 6/3/2025   WBC      4.4 - 11.3 x10*3/uL 9.4    nRBC      0.0 - 0.0 /100 WBCs    RBC      4.50 - 5.90 x10*6/uL 4.96    HEMOGLOBIN      13.5 - 17.5 g/dL 14.9    HEMATOCRIT      41.0 - 52.0 % 46.1    MCV      80 - 100 fL 93    MCHC      32.0 - 36.0 g/dL 32.3    Platelets      150 - 450 x10*3/uL 250    RED CELL DISTRIBUTION WIDTH      11.5 - 14.5 % 13.0    Neutrophils %      40.0 - 80.0 % 65.7    Immature Granulocytes %, Automated      0.0 - 0.9 % " 0.3    Lymphocytes %      13.0 - 44.0 % 22.2    Monocytes %      2.0 - 10.0 % 9.2    Eosinophils %      0.0 - 6.0 % 1.9    Basophils %      0.0 - 2.0 % 0.7    Neutrophils Absolute      1.60 - 5.50 x10*3/uL 6.14 (H)    Lymphocytes Absolute      0.80 - 3.00 x10*3/uL 2.08    Monocytes Absolute      0.05 - 0.80 x10*3/uL 0.86 (H)    Eosinophils Absolute      0.00 - 0.40 x10*3/uL 0.18    Basophils Absolute      0.00 - 0.10 x10*3/uL 0.07    MCH      26.0 - 34.0 pg 30.0    Immature Granulocytes Absolute, Automated      0.00 - 0.50 x10*3/uL 0.03       Legend:  (H) High    CT CHEST WO IV CONTRAST; 12/3/2024   IMPRESSION:  1. Emphysematous changes with bibasilar scarring.  2. redemonstration of mediastinal lymphadenopathy, without  significant change.  3. Prominent main pulmonary artery measuring up to 3.7 cm..  Correlation with history of pulmonary arterial hypertension is  suggested     CT UROGRAPHY WITH 3D VOLUME RENDERED IMAGING; 12/3/2024   IMPRESSION:  1. Extensive postoperative changes related to bladder excision and  ureteral loop ileostomy diversion. No obstruction or masses are seen.  No hydronephrosis or hydroureter.  2. Multiple low-attenuation lesions within bilateral kidneys  suggestive of cysts but some of them too small to fully characterize.  Correlation with ultrasound findings recommended.  3. Focal calcification of the left renal cortex similar to prior. No  enhancing renal masses are seen bilaterally.  4. Heterogeneous diffuse fatty infiltration of the liver similar to  prior. Correlation with liver function tests recommended.  5. Colonic diverticulosis but no CT evidence for acute diverticulitis.  6. Tiny 3 mm subpleural nodule within right lung base, stable since  prior accounting for difference in technique of imaging and slice  positioning. Attention in subsequent follow-up studies recommended.  7. Additional detailed nonacute findings as above.      FINAL DIAGNOSIS  A.  BLADDER AND PROSTATE,  CYSTOPROSTATECTOMY:    --BLADDER: INVASIVE PAPILLARY UROTHELIAL CARCINOMA, HIGH GRADE, WITH AREAS OF  SQUAMOUS DIFFERENTIATION, PATHOLOGICAL STAGE pT3a pN0. SEE CANCER SUMMARY  SHEET.  --PROSTATE: GLANDULAR AND STROMAL HYPERPLASIA. NECROTIZING AND NON-NECROTIZING  GRANULOMATOUS INFLAMMATION CONSISTENT WITH PRIOR THERAPY. SEE NOTE.    Note: Special stains for acid-fast bacilli and fungal organisms will be  performed on the prostatic granulomatous inflammation regions and the results  will be reported separately in an addendum.    B.  RIGHT PELVIC LYMPH NODES:    --5 LYMPH NODES WITH NO EVIDENCE OF MALIGNANCY.    C.  LEFT PELVIC LYMPH NODES:    --4 LYMPH NODES WITH NO EVIDENCE OF MALIGNANCY.    D.  RIGHT DISTAL URETER:    --SEGMENT OF URETER WITH NO EVIDENCE OF MALIGNANCY.    E.  LEFT DISTAL URETER:    --SEGMENT OF URETER WITH NO EVIDENCE OF MALIGNANCY.      Review of Systems  A complete review of systems was performed. All systems are noted to be negative unless indicated in the history of present illness, impression, active problem list, or past histories.     Objective   Physical Exam  Peristomal hernia     Assessment/Plan   Diagnoses and all orders for this visit:  Malignant neoplasm of urinary bladder, unspecified site (Multi)  Acute pyelonephritis      69 y.o. male presents for a follow up visit. He is status post open radical cystoprostatectomy and ileal conduit diversion 01/07/2022. He did not complete the CT chest or CT urogram prior to the visit. The patient's scans are scheduled for 08/2025.    I personally reviewed the patient's surgical pathology. The patient's surgical pathology did not detail any indication of prostate cancer. Therefore, there is no indication for repeat PSA level testing.     I personally reviewed the patient's ED visit and laboratory blood draw that indicated no evidence of elevated WBCs. I have advised the patient that although the patient's urinary culture indicated an infection, it  is likely that it was not severe as he did not have the above symptoms. His urine culture will always indicate an infection.     I have advised the patient to phone the clinic in regards to his imaging results after they are obtained.     Plan:  CT chest & CT urography to be completed in 07/2025  The patient will reach out after obtaining imaging via telephone       Scribe Attestation   By signing my name below, I, Alexx Tomlinson, Scribe attestation that this documentation has been prepared under the direction and in the presence of Carlos Manuel Ward MD.

## 2025-06-11 ENCOUNTER — APPOINTMENT (OUTPATIENT)
Dept: UROLOGY | Facility: CLINIC | Age: 70
End: 2025-06-11
Payer: MEDICARE

## 2025-06-11 DIAGNOSIS — C67.9 MALIGNANT NEOPLASM OF URINARY BLADDER, UNSPECIFIED SITE (MULTI): Primary | ICD-10-CM

## 2025-06-11 DIAGNOSIS — N10 ACUTE PYELONEPHRITIS: ICD-10-CM

## 2025-06-11 PROCEDURE — 3051F HG A1C>EQUAL 7.0%<8.0%: CPT | Performed by: STUDENT IN AN ORGANIZED HEALTH CARE EDUCATION/TRAINING PROGRAM

## 2025-06-11 PROCEDURE — 4010F ACE/ARB THERAPY RXD/TAKEN: CPT | Performed by: STUDENT IN AN ORGANIZED HEALTH CARE EDUCATION/TRAINING PROGRAM

## 2025-06-11 PROCEDURE — 3048F LDL-C <100 MG/DL: CPT | Performed by: STUDENT IN AN ORGANIZED HEALTH CARE EDUCATION/TRAINING PROGRAM

## 2025-06-11 PROCEDURE — 1126F AMNT PAIN NOTED NONE PRSNT: CPT | Performed by: STUDENT IN AN ORGANIZED HEALTH CARE EDUCATION/TRAINING PROGRAM

## 2025-06-11 PROCEDURE — 1159F MED LIST DOCD IN RCRD: CPT | Performed by: STUDENT IN AN ORGANIZED HEALTH CARE EDUCATION/TRAINING PROGRAM

## 2025-06-11 PROCEDURE — G2211 COMPLEX E/M VISIT ADD ON: HCPCS | Performed by: STUDENT IN AN ORGANIZED HEALTH CARE EDUCATION/TRAINING PROGRAM

## 2025-06-11 PROCEDURE — 1036F TOBACCO NON-USER: CPT | Performed by: STUDENT IN AN ORGANIZED HEALTH CARE EDUCATION/TRAINING PROGRAM

## 2025-06-11 PROCEDURE — 99214 OFFICE O/P EST MOD 30 MIN: CPT | Performed by: STUDENT IN AN ORGANIZED HEALTH CARE EDUCATION/TRAINING PROGRAM

## 2025-06-11 ASSESSMENT — PAIN SCALES - GENERAL: PAINLEVEL_OUTOF10: 0-NO PAIN

## 2025-06-24 ENCOUNTER — TELEPHONE (OUTPATIENT)
Dept: PRIMARY CARE | Facility: CLINIC | Age: 70
End: 2025-06-24
Payer: MEDICARE

## 2025-06-24 NOTE — TELEPHONE ENCOUNTER
MD Myra Farr MA  Caller: Unspecified (Today,  2:08 PM)         06/24/2025 - Patient Call: Francie Chacko and others  (Newest Message First)             View All Conversations on this Encounter  Beatrice Francis MD to Me  (Selected Message)        6/24/25  3:11 PM  I do not think it should be an issue just watch for diarrhea    6/24/25  2:48 PM  You routed this conversation to Beatrice Francis MD     6/24/25  2:13 PM  Francie Chacko routed this conversation to [email or web address] Clinical Support Staff   Francie Chacko      6/24/25  2:13 PM  Note     Pts wife Anatoly states pt is having a dental procedure tomorrow and they want to put pt on Clindamycin 300mg for a week. Anatoly would like to know if Valentina feels is ok with the medication. Please advise Anatoly...[phone]          6/24/25  2:08 PM  Anatoly Marquez (Emergency Contact) contacted Francie Chacko    Spoke with pt's wife. [place]

## 2025-06-24 NOTE — TELEPHONE ENCOUNTER
Pts wife Anatoly states pt is having a dental procedure tomorrow and they want to put pt on Clindamycin 300mg for a week. Anatoly would like to know if Valentina feels is ok with the medication. Please advise Anatoly...[phone]

## 2025-07-01 ENCOUNTER — LAB (OUTPATIENT)
Dept: LAB | Facility: HOSPITAL | Age: 70
End: 2025-07-01
Payer: MEDICARE

## 2025-07-01 DIAGNOSIS — I10 ESSENTIAL (PRIMARY) HYPERTENSION: ICD-10-CM

## 2025-07-01 DIAGNOSIS — C67.9 BLADDER CARCINOMA: ICD-10-CM

## 2025-07-01 PROCEDURE — 36415 COLL VENOUS BLD VENIPUNCTURE: CPT

## 2025-07-02 RX ORDER — LOSARTAN POTASSIUM 100 MG/1
100 TABLET ORAL DAILY
Qty: 90 TABLET | Refills: 1 | Status: SHIPPED | OUTPATIENT
Start: 2025-07-02

## 2025-07-10 LAB
COMMENTS - MP RESULT TYPE: NORMAL
SCAN RESULT: NORMAL

## 2025-07-21 ENCOUNTER — HOSPITAL ENCOUNTER (OUTPATIENT)
Dept: RADIOLOGY | Facility: HOSPITAL | Age: 70
Discharge: HOME | End: 2025-07-21
Payer: MEDICARE

## 2025-07-21 DIAGNOSIS — C67.9 MALIGNANT NEOPLASM OF URINARY BLADDER, UNSPECIFIED SITE (MULTI): ICD-10-CM

## 2025-07-21 LAB
CREAT SERPL-MCNC: 1.17 MG/DL (ref 0.6–1.3)
GFR SERPL CREATININE-BSD FRML MDRD: 67 ML/MIN/1.73M*2

## 2025-07-21 PROCEDURE — 76376 3D RENDER W/INTRP POSTPROCES: CPT | Performed by: RADIOLOGY

## 2025-07-21 PROCEDURE — 74178 CT ABD&PLV WO CNTR FLWD CNTR: CPT | Performed by: RADIOLOGY

## 2025-07-21 PROCEDURE — 71250 CT THORAX DX C-: CPT | Performed by: INTERNAL MEDICINE

## 2025-07-21 PROCEDURE — 74178 CT ABD&PLV WO CNTR FLWD CNTR: CPT

## 2025-07-21 PROCEDURE — 82565 ASSAY OF CREATININE: CPT

## 2025-07-21 PROCEDURE — 71250 CT THORAX DX C-: CPT

## 2025-07-21 PROCEDURE — 2550000001 HC RX 255 CONTRASTS: Performed by: STUDENT IN AN ORGANIZED HEALTH CARE EDUCATION/TRAINING PROGRAM

## 2025-07-21 RX ADMIN — IOHEXOL 75 ML: 350 INJECTION, SOLUTION INTRAVENOUS at 08:06

## 2025-08-25 DIAGNOSIS — E78.2 MIXED HYPERLIPIDEMIA: ICD-10-CM

## 2025-08-25 RX ORDER — ROSUVASTATIN CALCIUM 10 MG/1
10 TABLET, COATED ORAL DAILY
Qty: 90 TABLET | Refills: 2 | Status: SHIPPED | OUTPATIENT
Start: 2025-08-25

## 2025-08-30 DIAGNOSIS — E11.9 TYPE 2 DIABETES MELLITUS WITHOUT COMPLICATION, WITHOUT LONG-TERM CURRENT USE OF INSULIN: ICD-10-CM

## 2025-09-02 RX ORDER — METFORMIN HYDROCHLORIDE 500 MG/1
500 TABLET ORAL
Qty: 180 TABLET | Refills: 0 | Status: SHIPPED | OUTPATIENT
Start: 2025-09-02 | End: 2026-10-07

## 2025-09-29 ENCOUNTER — APPOINTMENT (OUTPATIENT)
Dept: PRIMARY CARE | Facility: CLINIC | Age: 70
End: 2025-09-29
Payer: MEDICARE